# Patient Record
Sex: FEMALE | Race: WHITE | NOT HISPANIC OR LATINO | Employment: STUDENT | URBAN - METROPOLITAN AREA
[De-identification: names, ages, dates, MRNs, and addresses within clinical notes are randomized per-mention and may not be internally consistent; named-entity substitution may affect disease eponyms.]

---
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
## 2020-01-02 ENCOUNTER — PROCEDURE VISIT (OUTPATIENT)
Dept: OBSTETRICS AND GYNECOLOGY | Facility: CLINIC | Age: 20
End: 2020-01-02
Payer: COMMERCIAL

## 2020-01-02 VITALS
DIASTOLIC BLOOD PRESSURE: 74 MMHG | WEIGHT: 139.13 LBS | HEIGHT: 68 IN | SYSTOLIC BLOOD PRESSURE: 106 MMHG | BODY MASS INDEX: 21.09 KG/M2

## 2020-01-02 DIAGNOSIS — Z30.016 ENCOUNTER FOR INITIAL PRESCRIPTION OF TRANSDERMAL PATCH HORMONAL CONTRACEPTIVE DEVICE: Primary | ICD-10-CM

## 2020-01-02 LAB
B-HCG UR QL: NEGATIVE
CTP QC/QA: YES

## 2020-01-02 PROCEDURE — 81025 POCT URINE PREGNANCY: ICD-10-PCS | Mod: S$GLB,,, | Performed by: OBSTETRICS & GYNECOLOGY

## 2020-01-02 PROCEDURE — 11981 INSERTION OF NEXPLANON: ICD-10-PCS | Mod: S$GLB,,, | Performed by: OBSTETRICS & GYNECOLOGY

## 2020-01-02 PROCEDURE — 81025 URINE PREGNANCY TEST: CPT | Mod: S$GLB,,, | Performed by: OBSTETRICS & GYNECOLOGY

## 2020-01-02 PROCEDURE — 11981 INSERTION DRUG DLVR IMPLANT: CPT | Mod: S$GLB,,, | Performed by: OBSTETRICS & GYNECOLOGY

## 2020-01-02 NOTE — PROCEDURES
Insertion of Nexplanon  Date/Time: 1/2/2020 8:15 AM  Performed by: Tiffanie Mc MD  Authorized by: Tiffanie Mc MD     Consent obtained:  Written  Consent given by:  Patient  Patient questions answered: yes    Patient agrees, verbalizes understanding, and wants to proceed: yes    Educational handouts given: yes    Instructions and paperwork completed: yes    Pre-procedure timeout performed: yes    Prepped with: alcohol 70% and povidone-iodine    Local anesthetic:  Lidocaine without epinephrine  The site was cleaned and prepped in a sterile fashion: yes    Small stab incision was made in arm: yes    Left/right:  Left   68 mg etonogestrel 68 mg  Preloaded Implanon trocar was placed subdermally: yes    Visualization of implant was obtained: yes    Nexplanon was inserted and trocar removed: yes    Visualization of notch in stilette and palpitation of device: yes    Palpitation confirms placement by provider and patient: yes    Site was closed with steri-strips and pressure bandage applied: yes

## 2020-06-19 ENCOUNTER — OFFICE VISIT (OUTPATIENT)
Dept: PULMONOLOGY | Facility: CLINIC | Age: 20
End: 2020-06-19
Payer: COMMERCIAL

## 2020-06-19 VITALS
OXYGEN SATURATION: 100 % | DIASTOLIC BLOOD PRESSURE: 75 MMHG | WEIGHT: 136.81 LBS | HEART RATE: 81 BPM | SYSTOLIC BLOOD PRESSURE: 118 MMHG | BODY MASS INDEX: 20.74 KG/M2 | HEIGHT: 68 IN

## 2020-06-19 DIAGNOSIS — G47.10 HYPERSOMNIA: Primary | ICD-10-CM

## 2020-06-19 PROCEDURE — 99203 PR OFFICE/OUTPT VISIT, NEW, LEVL III, 30-44 MIN: ICD-10-PCS | Mod: S$GLB,,, | Performed by: NURSE PRACTITIONER

## 2020-06-19 PROCEDURE — 99999 PR PBB SHADOW E&M-EST. PATIENT-LVL IV: CPT | Mod: PBBFAC,,, | Performed by: NURSE PRACTITIONER

## 2020-06-19 PROCEDURE — 99999 PR PBB SHADOW E&M-EST. PATIENT-LVL IV: ICD-10-PCS | Mod: PBBFAC,,, | Performed by: NURSE PRACTITIONER

## 2020-06-19 PROCEDURE — 3008F PR BODY MASS INDEX (BMI) DOCUMENTED: ICD-10-PCS | Mod: CPTII,S$GLB,, | Performed by: NURSE PRACTITIONER

## 2020-06-19 PROCEDURE — 3008F BODY MASS INDEX DOCD: CPT | Mod: CPTII,S$GLB,, | Performed by: NURSE PRACTITIONER

## 2020-06-19 PROCEDURE — 99203 OFFICE O/P NEW LOW 30 MIN: CPT | Mod: S$GLB,,, | Performed by: NURSE PRACTITIONER

## 2020-06-19 RX ORDER — MODAFINIL 200 MG/1
TABLET ORAL
COMMUNITY
Start: 2020-06-02 | End: 2020-08-24 | Stop reason: SDUPTHER

## 2020-06-19 RX ORDER — GABAPENTIN 300 MG/1
CAPSULE ORAL
COMMUNITY
Start: 2020-04-13 | End: 2020-12-15

## 2020-06-19 NOTE — PROGRESS NOTES
Linda Bradford  was seen as a new patient self referred for the evaluation of  Sleeping problems    CHIEF COMPLAINT:    Chief Complaint   Patient presents with    Sleeping Problem       HISTORY OF PRESENT ILLNESS: Linda Bradford is a 20 y.o. female with hx SVT s/p radiofreq ablation 2013 @[facility], deviated nasal septum s/p septoplasty  is here for sleep evaluation. Patient with symptoms of occasional snoring, vivid nightmares, + sleep paralysis 2 x a week, excessive daytime sleepiness, waking up with myalgia, possible muscle weakness (clumsy, drops things per patient) and fatigue. Reports vivid dreams and sometimes have difficulty differentiating this from reality. Presents with mom. Sleepiness noted when patient was a teenager attributed to developmental stage since mom also reported sleeping a lot as a teenager. Symptoms started affecting her activity daily living following infection with mononucleosis during Freshman year college. Pt goes to college in [place]. She had a deviated nasal septum repair around this time and this improve headaches and snoring.       Patient had HST and an in lab sleep study May 2019 NA but pt reports negative except for slight snoring.      She was trial on trazodone, prozac, xanax with minimal improvement. She currently on gabapentin which helps with nightmares and provigil during the day which helps keep her awake but she continues to feel sleepy and tired despite these medications.     [place] Sleepiness Scale score 19      SLEEP ROUTINE:  Time to bed:  2100  Sleep onset latency: 30 min      Disruptions or awakenings:   4 x without the gabapentin   Wakeup time:      1000  Perceived sleep quality:  restless   Daytime naps:     yes -1 nap(s) for about 1.5 hours usually around 1400 but anytime and  May doze off      PAST MEDICAL HISTORY:    Active Ambulatory Problems     Diagnosis Date Noted    SVT (supraventricular tachycardia) 01/22/2013    Palpitations 01/22/2013     Hypersomnia 2020     Resolved Ambulatory Problems     Diagnosis Date Noted    No Resolved Ambulatory Problems     Past Medical History:   Diagnosis Date    Supraventricular tachycardia                 PAST SURGICAL HISTORY:    Past Surgical History:   Procedure Laterality Date    RADIOFREQUENCY ABLATION           FAMILY HISTORY:                Family History   Problem Relation Age of Onset    Stroke Paternal Grandmother     Stroke Maternal Grandmother     Breast cancer Neg Hx     Colon cancer Neg Hx     Diabetes Neg Hx     Eclampsia Neg Hx     Ovarian cancer Neg Hx      labor Neg Hx        SOCIAL HISTORY:          Tobacco:   Social History     Tobacco Use   Smoking Status Never Smoker   Smokeless Tobacco Never Used       alcohol use:    Social History     Substance and Sexual Activity   Alcohol Use Yes                 Occupation:student    ALLERGIES:  Review of patient's allergies indicates:  No Known Allergies    CURRENT MEDICATIONS:    Current Outpatient Medications   Medication Sig Dispense Refill    ibuprofen (ADVIL,MOTRIN) 400 MG tablet Take 400 mg by mouth.      gabapentin (NEURONTIN) 300 MG capsule       modafiniL (PROVIGIL) 200 MG Tab        Current Facility-Administered Medications   Medication Dose Route Frequency Provider Last Rate Last Dose    etonogestrel subdermal device 68 mg  68 mg   Tiffanie Mc MD   68 mg at 20 0815                  REVIEW OF SYSTEMS:   Review of Systems   Constitutional: Positive for activity change and fatigue. Negative for fever and chills.   HENT: Positive for congestion (occ).    Respiratory: Positive for snoring and somnolence. Negative for shortness of breath.         Sih alot   Cardiovascular: Negative for chest pain, palpitations and leg swelling.   Musculoskeletal: Positive for myalgias.   Gastrointestinal: Negative for acid reflux.   Neurological: Positive for headaches.   Psychiatric/Behavioral: Positive for sleep disturbance.  "       PHYSICAL EXAM:  Vitals:    06/19/20 1430   BP: 118/75   Pulse: 81   SpO2: 100%   Weight: 62.1 kg (136 lb 12.7 oz)   Height: 5' 8" (1.727 m)   PainSc: 0-No pain     Body mass index is 20.8 kg/m².   Physical Exam   Constitutional: She is oriented to person, place, and time. She appears well-developed. No distress. She is not obese.   HENT:   Head: Normocephalic.   Nose: Nose normal.   Mouth/Throat: Oropharynx is clear and moist. Mallampati Score: II.   Neck: Neck supple.   Cardiovascular: Normal rate, regular rhythm and normal heart sounds.   Pulmonary/Chest: Normal expansion, effort normal and breath sounds normal.   Musculoskeletal:         General: No edema.   Neurological: She is alert and oriented to person, place, and time. Gait normal.   Skin: Skin is warm. She is not diaphoretic.   Psychiatric: She has a normal mood and affect. Her behavior is normal. Judgment and thought content normal.                                               DATA:  TSH:  No results found for: TSH  CBC:  No results found for: WBC, HGB, HCT, MCV, PLT  BMP:  No results found for: NA, K, CL, CO2, BUN, CREATININE, CALCIUM, ANIONGAP, ESTGFRAFRICA, EGFRNONAA  HgbA1C:  No results found for: LABA1C, HGBA1C       Sleep study: SKYLAR to obtain from [facility] in [place]          ASSESSMENT    ICD-10-CM ICD-9-CM    1. Hypersomnia  G47.10 780.54 Polysomnography with mslt       PLAN:    Problem List Items Addressed This Visit        Unprioritized    Hypersomnia - Primary    Overview     Patient with symptoms of excessive daytime sleepiness impairing daytime function with sleep paralysis and symptoms of possible cataplexy despite a negative workup for sleep apnea and stimulants. Recommend a PSG with MSLT for further evaluation for possible narcolepsy.          Current Assessment & Plan     Patient aware need to discontinue her medications atleast 2 weeks prior to PSG with MSLT. Pt goes to school in [place] and request her study be completed between " July 22 and August 1st or during Reyes break due to school schedule.          Relevant Orders    Polysomnography with mslt          Patient will Follow up pending sleep study.

## 2020-06-19 NOTE — LETTER
June 22, 2020      [employer]  [address] [address]  [facility] [address]           [facility]  [address]   [place] [address]  Phone: [phone]  Fax: [phone]          Patient: Linda Bradford   MR Number: [id number]   YOB: 2000   Date of Visit: 6/19/2020       Dear [employer]:    Thank you for referring Linda Bradford to me for evaluation. Attached you will find relevant portions of my assessment and plan of care.    If you have questions, please do not hesitate to call me. I look forward to following Linda Bradford along with you.    Sincerely,    Theresa Martell, NP    Enclosure  CC:  No Recipients    If you would like to receive this communication electronically, please contact [email or web address] or [phone] to request more information on [employer] access.    For providers and/or their staff who would like to refer a patient to [facility], please contact us through our one-stop-shop provider referral line, Miriam Lam, at [phone].    If you feel you have received this communication in error or would no longer like to receive these types of communications, please e-mail [email or web address]

## 2020-06-19 NOTE — PATIENT INSTRUCTIONS
Information regarding testing ordered by your provider today:    IN LAB, INITIAL DIAGNOSTIC STUDY:  Your provider has ordered a sleep study. This order has been sent to our billing/pre-service department to be approved by your insurance company. Once the study has been approved (this can take up to 14 business days depending on your insurance), we will call you to schedule an appointment. Once the appointment is scheduled, our Financial Clearance Call Center will be able to provide you with an anticipated out of pocket cost, such as a co-payment or unmet deductible amount. The Financial Clearance Call Center can be reached at [phone]. You may also call your insurance company directly and give them the procedure code CPT 22953 to receive an estimate of your out of pocket cost.

## 2020-06-22 PROBLEM — G47.10 HYPERSOMNIA: Status: ACTIVE | Noted: 2020-06-22

## 2020-06-22 NOTE — ASSESSMENT & PLAN NOTE
Patient aware need to discontinue her medications atleast 2 weeks prior to PSG with MSLT. Pt goes to school in [place] and request her study be completed between July 22 and August 1st or during Reyes break due to school schedule.

## 2020-07-29 ENCOUNTER — HOSPITAL ENCOUNTER (OUTPATIENT)
Dept: SLEEP MEDICINE | Facility: OTHER | Age: 20
Discharge: HOME | End: 2020-07-29
Attending: NURSE PRACTITIONER
Payer: COMMERCIAL

## 2020-07-29 DIAGNOSIS — G47.33 OSA (OBSTRUCTIVE SLEEP APNEA): ICD-10-CM

## 2020-07-29 DIAGNOSIS — G47.10 HYPERSOMNIA: ICD-10-CM

## 2020-07-29 PROCEDURE — 95810 POLYSOM 6/> YRS 4/> PARAM: CPT | Mod: 26,,, | Performed by: INTERNAL MEDICINE

## 2020-07-29 PROCEDURE — 95810 POLYSOM 6/> YRS 4/> PARAM: CPT

## 2020-07-29 PROCEDURE — 95810 PR POLYSOMNOGRAPHY, 4 OR MORE: ICD-10-PCS | Mod: 26,,, | Performed by: INTERNAL MEDICINE

## 2020-07-29 NOTE — Clinical Note
Theresa mslt was canceled due to high ahi.  Patient is leaving town today and was hoping to hear from you today.

## 2020-07-30 ENCOUNTER — TELEPHONE (OUTPATIENT)
Dept: PULMONOLOGY | Facility: CLINIC | Age: 20
End: 2020-07-30

## 2020-07-30 DIAGNOSIS — G47.33 OSA (OBSTRUCTIVE SLEEP APNEA): Primary | ICD-10-CM

## 2020-07-30 DIAGNOSIS — G47.10 HYPERSOMNIA: ICD-10-CM

## 2020-07-30 RX ORDER — METHYLPHENIDATE HYDROCHLORIDE 5 MG/1
TABLET ORAL
Qty: 60 TABLET | Refills: 0 | Status: SHIPPED | OUTPATIENT
Start: 2020-07-30 | End: 2020-08-03 | Stop reason: SDUPTHER

## 2020-07-30 NOTE — TELEPHONE ENCOUNTER
Spoke to pt, inform of sleep study. Trial apap, pt going to [place] for school so will  during thanksgiving break and f/u after laurie. Ritalin as needed in addition to provigil for EDS.

## 2020-07-30 NOTE — PROCEDURES
"Dear Provider,     You have ordered sleep LAB services to perform the sleep study for Linda Bradford.  The sleep study that you ordered is complete.      Please find Sleep Study result in "Chart Review" under the "Media tab."      As the ordering provider, you are responsible for reviewing the results and implementing a treatment plan with your patient.    If you need a Sleep Medicine provider to explain the sleep study findings and arrange treatment for the patient, please refer patient for consultation to our Sleep Clinic via [facility] with Ambulatory Consult Sleep.    To do that please place an order for an  "Ambulatory Consult Sleep" - it will go to our clinic work queue for our Medical Assistant to contact the patient for an appointment.     For any questions, please contact our clinic staff at [phone] to talk to clinical staff.   "

## 2020-07-30 NOTE — TELEPHONE ENCOUNTER
----- Message from Parul Rosas MA sent at 7/30/2020  4:59 PM CDT -----  Spoke to patient she states that she is returning your phone call in regards to her sleep study please advise.    Thank you   ----- Message -----  From: Ivanna Galeas  Sent: 7/30/2020   4:54 PM CDT  To: [facility] Staff        Name of Who is Calling: JAIRO FREITAS [[id number]]      What is the request in detail: Pt would like to speak with the office.Please contact to further discuss and advise.          Can the clinic reply by [facility]: N      What Number to Call Back [facility] not [facility]: [phone]

## 2020-07-30 NOTE — PROGRESS NOTES
Linda Bradford to [facility] for an overnight sleep study on 07/29/2020. Pt. education,setup explanation given prior to study.    Pt. did not meet emergent criteria for cpap.  Few events observed . No snore. Arousals noted.    Short episode of somnilquy.  Arm leads added prior to study follwing report of frequent nightmares and restless sleep.  No excessive motor activity observed with REM.    EKG- Lead 2 appears wirh NSR.  Low saturation noted 95%.    AM report to MARIA ISABEL JIANG.  MSLT to follow this AM.

## 2020-08-03 ENCOUNTER — TELEPHONE (OUTPATIENT)
Dept: PULMONOLOGY | Facility: CLINIC | Age: 20
End: 2020-08-03

## 2020-08-03 DIAGNOSIS — G47.10 HYPERSOMNIA: ICD-10-CM

## 2020-08-03 DIAGNOSIS — G47.33 OSA (OBSTRUCTIVE SLEEP APNEA): ICD-10-CM

## 2020-08-03 RX ORDER — METHYLPHENIDATE HYDROCHLORIDE 5 MG/1
TABLET ORAL
Qty: 60 TABLET | Refills: 0 | Status: SHIPPED | OUTPATIENT
Start: 2020-08-03 | End: 2020-08-20 | Stop reason: SDUPTHER

## 2020-08-03 NOTE — TELEPHONE ENCOUNTER
----- Message from Parul Rosas MA sent at 8/3/2020  1:53 PM CDT -----  Regarding: FW: medication  Patient states that the [facility] pharmacy never received the EPrescribe , patient would like the medication to go to [address] Where she is currently located at the moment.    Please advise  Thank you   ----- Message -----  From: Kayla Goncalves  Sent: 8/3/2020  12:42 PM CDT  To: Jasbir Cardenas Staff  Subject: medication                                       Type: Patient Call Back    Who called:self    What is the request in detail:patient would like to speak to nurse regarding medication    Can the clinic reply by [facility]?no    Would the patient rather a call back or a response via [facility]? callback    Best call back number:  [phone]

## 2020-08-03 NOTE — TELEPHONE ENCOUNTER
Patient states that the [facility] pharmacy never received the EPrescribe , patient would like the medication to go to [address] Where she is currently located at the moment.

## 2020-08-05 DIAGNOSIS — G47.10 HYPERSOMNIA: ICD-10-CM

## 2020-08-05 DIAGNOSIS — G47.33 OSA (OBSTRUCTIVE SLEEP APNEA): ICD-10-CM

## 2020-08-05 NOTE — TELEPHONE ENCOUNTER
----- Message from KalebMauriziojohana Del Rio sent at 8/5/2020 10:28 AM CDT -----  Regarding: Refill  Contact: PATIENT  Type: Patient Call Back    Who called:Patient     What is the request in detail: She needs refill on: methylphenidate HCl (RITALIN) 5 MG tablet; she never received refill    Can the clinic reply by [facility]? NO    Would the patient rather a call back or a response via [facility]? CALL    Best call back number: [phone] (home)     Additional Information:   [id number] [place] [facility] [place] [place] - [address] [place] [address]  Phone: [phone] Fax: [phone]

## 2020-08-11 RX ORDER — METHYLPHENIDATE HYDROCHLORIDE 5 MG/1
TABLET ORAL
Qty: 60 TABLET | Refills: 0 | OUTPATIENT
Start: 2020-08-11

## 2020-08-13 ENCOUNTER — TELEPHONE (OUTPATIENT)
Dept: PULMONOLOGY | Facility: CLINIC | Age: 20
End: 2020-08-13

## 2020-08-13 NOTE — TELEPHONE ENCOUNTER
Left voicemail        ----- Message from Magaly Kelly sent at 8/12/2020 12:23 PM CDT -----  Type:  Patient Returning Call    Who Called: pt    Who Left Message for Patient:  Parul    Does the patient know what this is regarding?:    Would the patient rather a call back or a response via [facility]? Call back     Best Call Back Number: [phone]    Additional Information:

## 2020-08-20 ENCOUNTER — TELEPHONE (OUTPATIENT)
Dept: PULMONOLOGY | Facility: CLINIC | Age: 20
End: 2020-08-20

## 2020-08-20 ENCOUNTER — TELEPHONE (OUTPATIENT)
Dept: SLEEP MEDICINE | Facility: CLINIC | Age: 20
End: 2020-08-20

## 2020-08-20 DIAGNOSIS — G47.33 OSA (OBSTRUCTIVE SLEEP APNEA): ICD-10-CM

## 2020-08-20 DIAGNOSIS — G47.10 HYPERSOMNIA: ICD-10-CM

## 2020-08-20 RX ORDER — METHYLPHENIDATE HYDROCHLORIDE 5 MG/1
TABLET ORAL
Qty: 60 TABLET | Refills: 0 | Status: SHIPPED | OUTPATIENT
Start: 2020-08-20 | End: 2020-12-15

## 2020-08-21 ENCOUNTER — TELEPHONE (OUTPATIENT)
Dept: SLEEP MEDICINE | Facility: CLINIC | Age: 20
End: 2020-08-21

## 2020-08-21 NOTE — TELEPHONE ENCOUNTER
----- Message from Charisse Del Rio sent at 8/21/2020 12:36 PM CDT -----  Type:  Patient Returning Call    Who Called:  Self     Who Left Message for Patient:  Dr. Dillon    Does the patient know what this is regarding?: yes     Would the patient rather a call back or a response via [facility]?  Call     Best Call Back Number:[phone]

## 2020-08-21 NOTE — TELEPHONE ENCOUNTER
----- Message from Oly Martin sent at 8/21/2020  1:36 PM CDT -----  Regarding: self [phone]  .Type:  Patient Returning Call    Who Called:  self     Who Left Message for Patient: Melissa Dillon    Would the patient rather a call back or a response via [facility]?  Call back     Best Call Back Number: [phone]

## 2020-08-24 ENCOUNTER — TELEPHONE (OUTPATIENT)
Dept: SLEEP MEDICINE | Facility: CLINIC | Age: 20
End: 2020-08-24

## 2020-08-24 ENCOUNTER — OFFICE VISIT (OUTPATIENT)
Dept: SLEEP MEDICINE | Facility: CLINIC | Age: 20
End: 2020-08-24
Payer: COMMERCIAL

## 2020-08-24 DIAGNOSIS — G47.10 HYPERSOMNIA: ICD-10-CM

## 2020-08-24 DIAGNOSIS — G47.33 OSA (OBSTRUCTIVE SLEEP APNEA): Primary | ICD-10-CM

## 2020-08-24 PROCEDURE — 99203 PR OFFICE/OUTPT VISIT, NEW, LEVL III, 30-44 MIN: ICD-10-PCS | Mod: 95,,, | Performed by: INTERNAL MEDICINE

## 2020-08-24 PROCEDURE — 99203 OFFICE O/P NEW LOW 30 MIN: CPT | Mod: 95,,, | Performed by: INTERNAL MEDICINE

## 2020-08-24 RX ORDER — MODAFINIL 200 MG/1
200 TABLET ORAL 2 TIMES DAILY
Qty: 60 TABLET | Refills: 3 | Status: SHIPPED | OUTPATIENT
Start: 2020-08-24 | End: 2020-10-16 | Stop reason: SDUPTHER

## 2020-08-24 NOTE — TELEPHONE ENCOUNTER
----- Message from Melissa Dillon MD sent at 8/24/2020 11:47 AM CDT -----  Please get copy of sleep study 2019 from [facility] in [place].   Thanks

## 2020-08-24 NOTE — PROGRESS NOTES
Subjective:       Patient ID: Linda Bradford is a 20 y.o. female.    Chief Complaint: Sleeping Problem    HPI     I had the pleasure of seeing Linda Bradford today, who is a 20 y.o. female that presents with Obstructive Sleep Apnea  .    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    Linda Bradford presents with TIM that has been going on for 13 months   PSG showed AHI 16.   Patient is not currently treated.   She was placed on Ritalin to combat sleepiness until treated.   Patient had sleep study in [place] in 2019 that was reportedly normal.   Patient also on Provigil, taking 200 mg daily.   Feels sleepy around 1500.      Bedtime when working ranges from NA to NA.   When not working, bedtime ranges from 2200 to 2300.   Sleep latency ranges from 10 to 15 minutes.     Average number of awakenings is 0-1 and return to sleep is quick.   Wake up time when working is NA to NA.   When not working, wake up time is 1030 to 1100.   Patient does not feel rested upon awakening.         Linda Bradford does experience daytime sleepiness.   Naps are taken about 4 times weekly, usually lasting 45 to 60 minutes.  Linda currently does operate an automobile.  Linda Bradford does not experience drowsiness when driving.   Patient does doze off when sedentary.   Linda Bradford does not have auxiliary symptoms of narcolepsy including sleep onset paralysis, hypnagogic hallucinations, sleep attacks and cataplexy    EPWORTH SLEEPINESS SCALE 6/22/2020   Sitting and reading 3   Watching TV 3   Sitting, inactive in a public place (e.g. a theatre or a meeting) 2   As a passenger in a car for an hour without a break 3   Lying down to rest in the afternoon when circumstances permit 3   Sitting and talking to someone 1   Sitting quietly after a lunch without alcohol 2   In a car, while stopped for a few minutes in traffic 2   Total score 19       Linda Bradford has a history of snoring.    Snoring is described as mild and constant.   Apneic episodes have been noticed during sleep.   A witness to sleep is not present.   The patient awakens with mouth dryness.      Linda Bradford does not have symptoms of Restless Legs Syndrome. Nocturnal leg movements have not been noticed.   The patient does not experience sleep related leg cramps.   There is not a history of parasomnia except sleep walking during childhood.      Current Outpatient Medications:     gabapentin (NEURONTIN) 300 MG capsule, , Disp: , Rfl:     ibuprofen (ADVIL,MOTRIN) 400 MG tablet, Take 400 mg by mouth., Disp: , Rfl:     methylphenidate HCl (RITALIN) 5 MG tablet, Take 1-2 tablet by mouth in afternoon as needed for hypersomnia, Disp: 60 tablet, Rfl: 0    modafiniL (PROVIGIL) 200 MG Tab, , Disp: , Rfl:     Current Facility-Administered Medications:     etonogestrel subdermal device 68 mg, 68 mg, , , Tiffanie Mc MD, 68 mg at 20 0815     Review of patient's allergies indicates:  No Known Allergies      Past Medical History:   Diagnosis Date    Supraventricular tachycardia        Past Surgical History:   Procedure Laterality Date    RADIOFREQUENCY ABLATION         Family History   Problem Relation Age of Onset    Stroke Paternal Grandmother     Stroke Maternal Grandmother     Breast cancer Neg Hx     Colon cancer Neg Hx     Diabetes Neg Hx     Eclampsia Neg Hx     Ovarian cancer Neg Hx      labor Neg Hx        Social History     Socioeconomic History    Marital status: Single     Spouse name: Not on file    Number of children: Not on file    Years of education: Not on file    Highest education level: Not on file   Occupational History    Not on file   Social Needs    Financial resource strain: Not on file    Food insecurity     Worry: Not on file     Inability: Not on file    Transportation needs     Medical: Not on file     Non-medical: Not on file   Tobacco Use    Smoking status: Never Smoker     Smokeless tobacco: Never Used   Substance and Sexual Activity    Alcohol use: Yes    Drug use: No    Sexual activity: Yes     Partners: Male     Birth control/protection: Condom   Lifestyle    Physical activity     Days per week: Not on file     Minutes per session: Not on file    Stress: Not on file   Relationships    Social connections     Talks on phone: Not on file     Gets together: Not on file     Attends Tenriism service: Not on file     Active member of club or organization: Not on file     Attends meetings of clubs or organizations: Not on file     Relationship status: Not on file   Other Topics Concern    Not on file   Social History Narrative    Not on file           Old medical records.    There were no vitals filed for this visit.           The patient was given open opportunity to ask questions and/or express concerns about treatment plan.   All questions/concerns were discussed.   Driving precautions were provided.     Two patient identifiers used prior to evaluation.    Thank you for referring Linda Bradford for evaluation.           Review of Systems      Objective:      Physical Exam    Assessment:       1. TIM (obstructive sleep apnea)    2. Hypersomnia        Plan:        The pathophysiology of TIM was discussed.   The effects of TIM on patient's co-morbid conditions and the increased morbidity and/or mortality associated with this condition were reviewed.   The patient was given open opportunity to ask questions and/or express concerns about treatment plan.   All questions/concerns were discussed.   Driving precautions were provided.   Prior records requested.   Do not start Ritalin.   Provigil 200 mg in morning and 100-200 mg around 1400.   Discussed oral appliance.       Thank you for referring Lidna Bradford for evaluation.       The patient location is: [facility]  The chief complaint leading to consultation is: hypersomnia    Visit type: audiovisual    Face to Face time with  patient: 21  32 minutes of total time spent on the encounter, which includes face to face time and non-face to face time preparing to see the patient (eg, review of tests), Obtaining and/or reviewing separately obtained history, Documenting clinical information in the electronic or other health record, Independently interpreting results (not separately reported) and communicating results to the patient/family/caregiver, or Care coordination (not separately reported).         Each patient to whom he or she provides medical services by telemedicine is:  (1) informed of the relationship between the physician and patient and the respective role of any other health care provider with respect to management of the patient; and (2) notified that he or she may decline to receive medical services by telemedicine and may withdraw from such care at any time.    Notes:

## 2020-08-24 NOTE — TELEPHONE ENCOUNTER
----- Message from Melissa Dillon MD sent at 8/24/2020 11:47 AM CDT -----  Please get copy of sleep study 2019 from [facility] in [place].   Thanks

## 2020-08-26 ENCOUNTER — PATIENT MESSAGE (OUTPATIENT)
Dept: SLEEP MEDICINE | Facility: CLINIC | Age: 20
End: 2020-08-26

## 2020-08-26 DIAGNOSIS — G47.33 OSA (OBSTRUCTIVE SLEEP APNEA): Primary | ICD-10-CM

## 2020-08-26 DIAGNOSIS — G47.10 HYPERSOMNIA: ICD-10-CM

## 2020-09-22 ENCOUNTER — PATIENT MESSAGE (OUTPATIENT)
Dept: SLEEP MEDICINE | Facility: CLINIC | Age: 20
End: 2020-09-22

## 2020-10-04 ENCOUNTER — PATIENT MESSAGE (OUTPATIENT)
Dept: SLEEP MEDICINE | Facility: CLINIC | Age: 20
End: 2020-10-04

## 2020-11-09 ENCOUNTER — OFFICE VISIT (OUTPATIENT)
Dept: SLEEP MEDICINE | Facility: CLINIC | Age: 20
End: 2020-11-09
Payer: COMMERCIAL

## 2020-11-09 DIAGNOSIS — G47.33 OSA (OBSTRUCTIVE SLEEP APNEA): Primary | ICD-10-CM

## 2020-11-09 PROCEDURE — 99213 PR OFFICE/OUTPT VISIT, EST, LEVL III, 20-29 MIN: ICD-10-PCS | Mod: 95,,, | Performed by: INTERNAL MEDICINE

## 2020-11-09 PROCEDURE — 99213 OFFICE O/P EST LOW 20 MIN: CPT | Mod: 95,,, | Performed by: INTERNAL MEDICINE

## 2020-11-09 NOTE — PROGRESS NOTES
Subjective:       Patient ID: Linda Bradford is a 20 y.o. female.    Chief Complaint: Sleep Apnea    HPI     Got oral appliance for TIM.   In process of progressively advancing jaw placement.   Advised to move to position where front teeth aligned before further adjustment.   Still using Provigil most days at 400 mg.   Will try to discontinue when home for school break and using oral appliance and final adjustment.     Review of Systems      Objective:      Physical Exam    Assessment:       1. TIM (obstructive sleep apnea)        Plan:       HST when oral appliance adjusted.    The patient location is: school  The chief complaint leading to consultation is: TIM    Visit type: audiovisual    Face to Face time with patient: 12  16 minutes of total time spent on the encounter, which includes face to face time and non-face to face time preparing to see the patient (eg, review of tests), Obtaining and/or reviewing separately obtained history, Documenting clinical information in the electronic or other health record, Independently interpreting results (not separately reported) and communicating results to the patient/family/caregiver, or Care coordination (not separately reported).         Each patient to whom he or she provides medical services by telemedicine is:  (1) informed of the relationship between the physician and patient and the respective role of any other health care provider with respect to management of the patient; and (2) notified that he or she may decline to receive medical services by telemedicine and may withdraw from such care at any time.    Notes:

## 2020-11-16 ENCOUNTER — TELEPHONE (OUTPATIENT)
Dept: SLEEP MEDICINE | Facility: OTHER | Age: 20
End: 2020-11-16

## 2020-11-23 ENCOUNTER — TELEPHONE (OUTPATIENT)
Dept: SLEEP MEDICINE | Facility: CLINIC | Age: 20
End: 2020-11-23

## 2020-11-23 NOTE — TELEPHONE ENCOUNTER
----- Message from Seamus Polk sent at 11/23/2020  3:42 PM CST -----  Name of Who is Calling: JAIRO FREITAS  What is the request in detail: The patient is calling to speak to the nurse in regards to finding out if she can return the equipment Monday 11/30/2020 in stead of Friday 11/27/2020 due to her being out of town for the holidays. Please advise Can the clinic reply by [facility]: Yes What Number to Call Back [facility] not [facility]: [phone]

## 2020-11-23 NOTE — TELEPHONE ENCOUNTER
----- Message from Seamus Polk sent at 11/23/2020  3:42 PM CST -----  Name of Who is Calling: JAIRO FREITAS  What is the request in detail: The patient is calling to speak to the nurse in regards to finding out if she can return the equipment Monday 11/30/2020 in stead of Friday 11/27/2020 due to her being out of town for the holidays. Please advise Can the clinic reply by [facility]: Yes What Number to Call Back if not [facility]: [phone]

## 2020-11-24 ENCOUNTER — TELEPHONE (OUTPATIENT)
Dept: SLEEP MEDICINE | Facility: OTHER | Age: 20
End: 2020-11-24

## 2020-12-03 ENCOUNTER — TELEPHONE (OUTPATIENT)
Dept: SLEEP MEDICINE | Facility: OTHER | Age: 20
End: 2020-12-03

## 2020-12-04 ENCOUNTER — HOSPITAL ENCOUNTER (OUTPATIENT)
Dept: SLEEP MEDICINE | Facility: OTHER | Age: 20
Discharge: HOME | End: 2020-12-04
Attending: INTERNAL MEDICINE
Payer: COMMERCIAL

## 2020-12-04 DIAGNOSIS — G47.33 OSA (OBSTRUCTIVE SLEEP APNEA): ICD-10-CM

## 2020-12-04 PROCEDURE — 95800 SLP STDY UNATTENDED: CPT

## 2020-12-04 PROCEDURE — 95800 PR SLEEP STUDY, UNATTENDED, RECORD HEART RATE/O2 SAT/RESP ANAL/SLEEP TIME: ICD-10-PCS | Mod: 26,,, | Performed by: INTERNAL MEDICINE

## 2020-12-04 PROCEDURE — 95800 SLP STDY UNATTENDED: CPT | Mod: 26,,, | Performed by: INTERNAL MEDICINE

## 2020-12-08 ENCOUNTER — PATIENT MESSAGE (OUTPATIENT)
Dept: SLEEP MEDICINE | Facility: CLINIC | Age: 20
End: 2020-12-08

## 2020-12-08 NOTE — PROCEDURES
"The sleep study that you ordered is complete.  You have ordered sleep LAB services to perform the sleep study for Linda Bradford     Please find Sleep Study result in  the "Media tab" of Chart Review menu.     Patient is already established with a Sleep Medicine provider        For any questions, please contact our clinic staff at [phone] to talk to clinical staff.     "

## 2020-12-09 ENCOUNTER — TELEPHONE (OUTPATIENT)
Dept: INTERNAL MEDICINE | Facility: CLINIC | Age: 20
End: 2020-12-09

## 2020-12-09 ENCOUNTER — PATIENT MESSAGE (OUTPATIENT)
Dept: SLEEP MEDICINE | Facility: CLINIC | Age: 20
End: 2020-12-09

## 2020-12-09 NOTE — TELEPHONE ENCOUNTER
----- Message from Fred Dominguez sent at 12/9/2020  2:19 PM CST -----  Contact: Pt mother Cee  The pt mother, Cee, called and said that she left a message there a week ago and no one has called her back    Cee and her  see Dr. Gill and she would like to know, either way, if he would see their daughter.     Cee can be reached at [phone]

## 2020-12-10 NOTE — TELEPHONE ENCOUNTER
Informed pt mother of  message below. Pt scheduled on 12/15/2020 @ 8 am [facility] IM.  Pt mother verbalized understanding

## 2020-12-11 ENCOUNTER — PATIENT MESSAGE (OUTPATIENT)
Dept: SLEEP MEDICINE | Facility: CLINIC | Age: 20
End: 2020-12-11

## 2020-12-15 ENCOUNTER — OFFICE VISIT (OUTPATIENT)
Dept: SLEEP MEDICINE | Facility: CLINIC | Age: 20
End: 2020-12-15
Payer: COMMERCIAL

## 2020-12-15 ENCOUNTER — OFFICE VISIT (OUTPATIENT)
Dept: INTERNAL MEDICINE | Facility: CLINIC | Age: 20
End: 2020-12-15
Payer: COMMERCIAL

## 2020-12-15 ENCOUNTER — TELEPHONE (OUTPATIENT)
Dept: SLEEP MEDICINE | Facility: CLINIC | Age: 20
End: 2020-12-15

## 2020-12-15 ENCOUNTER — OFFICE VISIT (OUTPATIENT)
Dept: OTOLARYNGOLOGY | Facility: CLINIC | Age: 20
End: 2020-12-15
Payer: COMMERCIAL

## 2020-12-15 VITALS
HEIGHT: 68 IN | SYSTOLIC BLOOD PRESSURE: 105 MMHG | DIASTOLIC BLOOD PRESSURE: 66 MMHG | HEART RATE: 106 BPM | BODY MASS INDEX: 20.58 KG/M2 | TEMPERATURE: 98 F | WEIGHT: 135.81 LBS

## 2020-12-15 VITALS
DIASTOLIC BLOOD PRESSURE: 82 MMHG | HEIGHT: 68 IN | HEART RATE: 88 BPM | OXYGEN SATURATION: 99 % | BODY MASS INDEX: 20.32 KG/M2 | WEIGHT: 134.06 LBS | SYSTOLIC BLOOD PRESSURE: 126 MMHG

## 2020-12-15 DIAGNOSIS — G47.9 SLEEP DISTURBANCE: Primary | ICD-10-CM

## 2020-12-15 DIAGNOSIS — R53.83 FATIGUE, UNSPECIFIED TYPE: ICD-10-CM

## 2020-12-15 DIAGNOSIS — Z76.89 ENCOUNTER TO ESTABLISH CARE WITH NEW DOCTOR: Primary | ICD-10-CM

## 2020-12-15 DIAGNOSIS — G47.10 HYPERSOMNIA: ICD-10-CM

## 2020-12-15 DIAGNOSIS — G47.33 OSA (OBSTRUCTIVE SLEEP APNEA): ICD-10-CM

## 2020-12-15 DIAGNOSIS — G47.33 OSA (OBSTRUCTIVE SLEEP APNEA): Primary | ICD-10-CM

## 2020-12-15 DIAGNOSIS — R63.0 DECREASED APPETITE: ICD-10-CM

## 2020-12-15 PROBLEM — J34.2 DEVIATED NASAL SEPTUM: Status: ACTIVE | Noted: 2018-12-18

## 2020-12-15 PROCEDURE — 99213 PR OFFICE/OUTPT VISIT, EST, LEVL III, 20-29 MIN: ICD-10-PCS | Mod: 95,,, | Performed by: INTERNAL MEDICINE

## 2020-12-15 PROCEDURE — 99204 PR OFFICE/OUTPT VISIT, NEW, LEVL IV, 45-59 MIN: ICD-10-PCS | Mod: S$GLB,,, | Performed by: FAMILY MEDICINE

## 2020-12-15 PROCEDURE — 3008F BODY MASS INDEX DOCD: CPT | Mod: CPTII,S$GLB,, | Performed by: OTOLARYNGOLOGY

## 2020-12-15 PROCEDURE — 99204 OFFICE O/P NEW MOD 45 MIN: CPT | Mod: S$GLB,,, | Performed by: OTOLARYNGOLOGY

## 2020-12-15 PROCEDURE — 1126F PR PAIN SEVERITY QUANTIFIED, NO PAIN PRESENT: ICD-10-PCS | Mod: S$GLB,,, | Performed by: FAMILY MEDICINE

## 2020-12-15 PROCEDURE — 99999 PR PBB SHADOW E&M-EST. PATIENT-LVL III: ICD-10-PCS | Mod: PBBFAC,,, | Performed by: FAMILY MEDICINE

## 2020-12-15 PROCEDURE — 3008F PR BODY MASS INDEX (BMI) DOCUMENTED: ICD-10-PCS | Mod: CPTII,S$GLB,, | Performed by: OTOLARYNGOLOGY

## 2020-12-15 PROCEDURE — 3008F PR BODY MASS INDEX (BMI) DOCUMENTED: ICD-10-PCS | Mod: CPTII,S$GLB,, | Performed by: FAMILY MEDICINE

## 2020-12-15 PROCEDURE — 99999 PR PBB SHADOW E&M-EST. PATIENT-LVL III: CPT | Mod: PBBFAC,,, | Performed by: FAMILY MEDICINE

## 2020-12-15 PROCEDURE — 1126F AMNT PAIN NOTED NONE PRSNT: CPT | Mod: S$GLB,,, | Performed by: OTOLARYNGOLOGY

## 2020-12-15 PROCEDURE — 1126F PR PAIN SEVERITY QUANTIFIED, NO PAIN PRESENT: ICD-10-PCS | Mod: S$GLB,,, | Performed by: OTOLARYNGOLOGY

## 2020-12-15 PROCEDURE — 99213 OFFICE O/P EST LOW 20 MIN: CPT | Mod: 95,,, | Performed by: INTERNAL MEDICINE

## 2020-12-15 PROCEDURE — 3008F BODY MASS INDEX DOCD: CPT | Mod: CPTII,S$GLB,, | Performed by: FAMILY MEDICINE

## 2020-12-15 PROCEDURE — 99204 OFFICE O/P NEW MOD 45 MIN: CPT | Mod: S$GLB,,, | Performed by: FAMILY MEDICINE

## 2020-12-15 PROCEDURE — 1126F AMNT PAIN NOTED NONE PRSNT: CPT | Mod: S$GLB,,, | Performed by: FAMILY MEDICINE

## 2020-12-15 PROCEDURE — 99204 PR OFFICE/OUTPT VISIT, NEW, LEVL IV, 45-59 MIN: ICD-10-PCS | Mod: S$GLB,,, | Performed by: OTOLARYNGOLOGY

## 2020-12-15 NOTE — PROGRESS NOTES
"Subjective:       Patient ID: Linda Bradford is a 20 y.o. female.    Chief Complaint: Sleeping Problem    HPI     The results of Linda Bradford"s sleep study were reviewed in detail.   Treatment alternatives discussed.   Risks of untreated disease reviewed.   Goals of therapy were outlined.   Linda Bradford agrees to treatment plan.   Linda Bradford was giving opportunity to ask questions and voice concerns.       Two point patient identifier confirmed.           Review of Systems      Objective:      Physical Exam    Assessment:       1. TIM (obstructive sleep apnea)        Plan:       Patient wants to consider CPAP as option versus the oral appliance.   Will follow up with PCP and Gyn first (recommended work up for PCOS).       The patient location is: home  The chief complaint leading to consultation is: continued fatigue    Visit type: audiovisual    Face to Face time with patient: 9  16 minutes of total time spent on the encounter, which includes face to face time and non-face to face time preparing to see the patient (eg, review of tests), Obtaining and/or reviewing separately obtained history, Documenting clinical information in the electronic or other health record, Independently interpreting results (not separately reported) and communicating results to the patient/family/caregiver, or Care coordination (not separately reported).         Each patient to whom he or she provides medical services by telemedicine is:  (1) informed of the relationship between the physician and patient and the respective role of any other health care provider with respect to management of the patient; and (2) notified that he or she may decline to receive medical services by telemedicine and may withdraw from such care at any time.    Notes:        "

## 2020-12-15 NOTE — PROGRESS NOTES
"Subjective:       Patient ID: Linda Bradford is a 20 y.o. female.    Chief Complaint: Establish Care    HPI    Mom on speaker phone during visit.    20yoF PMHx SVT s/p radiofreq ablation (2013 at [facility]), Deviated nasal septum s/p septoplasty to est care. Goes to college in [place].     Persistent exhaustion.   Night sweats x1m  Swollen lymph node x 1-2 yrs since dx of mono, "never went away."  Decreased appetite.  Easy bruising.    Takes provigil 200 bid. Sometimes takes as prescribed, sometimes once daily.     Has seen psychiatry in the past. Has been on prozac for possible dx of anx/dep recently but stopped a couple months ago. Then went down the route of sleep disorder.     Had labs recently through [facility] as ordered by pediatrician. Mom will try and message results thru portal. Pt states cbc and thyroid labs were normal. Vitamin d low.     Has seen ENT in the past and has been told of enlarged uvula. Sees Dr. Miller but wants to get second opinion. Has appt today with Dr. Rios. H/o deviated septum surgery.    #Pulm:  - est w/ NP Jasbir    #Sleep med: TIM (using dental device; dx 2020)  - agusto w/ Dr. Dillon  - last sleep study 12/4/20    #Obgyn:  - agusto w/ Dr. Mc    Review of Systems   Constitutional: Positive for appetite change and fatigue. Negative for fever.   HENT: Negative for congestion.    Respiratory: Negative for shortness of breath.    Gastrointestinal: Negative for abdominal pain, constipation, diarrhea and vomiting.   Genitourinary: Negative for difficulty urinating.   Neurological: Positive for weakness and headaches.   Psychiatric/Behavioral: Positive for sleep disturbance.         Past Medical History:   Diagnosis Date    Supraventricular tachycardia         Prior to Admission medications    Medication Sig Start Date End Date Taking? Authorizing Provider   gabapentin (NEURONTIN) 300 MG capsule  4/13/20   Historical Provider   ibuprofen (ADVIL,MOTRIN) 400 MG tablet Take 400 mg by " "mouth.    Historical Provider   methylphenidate HCl (RITALIN) 5 MG tablet Take 1-2 tablet by mouth in afternoon as needed for hypersomnia 8/20/20   Theresa Martell NP   modafiniL (PROVIGIL) 200 MG Tab Take 1 tablet (200 mg total) by mouth 2 (two) times daily. 10/20/20   Melissa Dillon MD        Past medical history, surgical history, and family medical history reviewed and updated as appropriate.    Medications and allergies reviewed.     Objective:          Vitals:    12/15/20 0808   BP: 126/82   BP Location: Right arm   Patient Position: Sitting   BP Method: Medium (Manual)   Pulse: 88   SpO2: 99%   Weight: 60.8 kg (134 lb 0.6 oz)   Height: 5' 8" (1.727 m)     Body mass index is 20.38 kg/m².  Physical Exam  Vitals signs and nursing note reviewed.   Constitutional:       General: She is not in acute distress.     Appearance: She is not ill-appearing or toxic-appearing.   Eyes:      Extraocular Movements: Extraocular movements intact.   Neck:      Musculoskeletal: Full passive range of motion without pain.      Thyroid: No thyroid mass, thyromegaly or thyroid tenderness.   Cardiovascular:      Rate and Rhythm: Normal rate and regular rhythm.      Pulses: Normal pulses.      Heart sounds: No murmur.   Pulmonary:      Effort: Pulmonary effort is normal. No respiratory distress.   Abdominal:      Palpations: Abdomen is soft.   Lymphadenopathy:      Head:      Right side of head: Submandibular (nontender) adenopathy present. No submental, preauricular or posterior auricular adenopathy.      Left side of head: No submental, submandibular, preauricular or posterior auricular adenopathy.   Neurological:      Mental Status: She is alert and oriented to person, place, and time.   Psychiatric:         Mood and Affect: Mood normal.         Behavior: Behavior normal.      Comments: Appears generally frustrated with medical history and when explaining past diagnoses and workup, will tear up.         No results found for: WBC, HGB, " HCT, PLT, CHOL, TRIG, HDL, LDLDIRECT, ALT, AST, NA, K, CL, CREATININE, BUN, CO2, TSH, PSA, INR, GLUF, HGBA1C, MICROALBUR    Assessment:       1. Encounter to establish care with new doctor    2. TIM (obstructive sleep apnea)    3. Fatigue, unspecified type    4. Decreased appetite        Plan:   Linda was seen today for establish care.    Diagnoses and all orders for this visit:    Difficult case on initial visit. Used today's visit to go over recent workup and medical history as best as we could with the aid of mother on speaker phone. Pt recently had labs drawn thru [facility], records not available to view and family will try and upload to [facility] as soon as can for review as this would've been my first steps today.     Could some of these symptoms be related to adverse effects of provigil?    Are symptoms related to anxiety / depression?     Will be seeing ENT today for second opinion as far as source of cause of TIM    Follow up should symptoms persist or worsen. If symptoms become severe, please report immediately to urgent care or emergency room for further evaluation. Patient voiced understanding.      Encounter to establish care with new doctor    TIM (obstructive sleep apnea)    Fatigue, unspecified type    Decreased appetite      No follow-ups on file.    Robert Leo MD  Family Medicine  [facility]  12/15/2020

## 2020-12-15 NOTE — TELEPHONE ENCOUNTER
----- Message from Ivanna Galeas sent at 12/15/2020  4:00 PM CST -----      Name of Who is Calling: JAIRO FREITAS [[id number]]      What is the request in detail: Pt returned call to the office.Please contact to further discuss and advise.          Can the clinic reply by [facility]: N      What Number to Call Back [facility]: [phone]

## 2020-12-15 NOTE — PROGRESS NOTES
Ms. Bradford     Vitals:    12/15/20 1054   BP: 105/66   Pulse: 106   Temp: 98.2 °F (36.8 °C)       Chief Complaint:  sleep apnea       HPI:  is a 20-year-old white female who presents for 2nd opinion for possible sleep apnea.  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  She underwent a sleep study in [place] in 2019 which showed no evidence of TIM.  She has also seen various psychologist and psychiatrist to rule out stress and anxiety as a cause.  She ultimately was evaluated by  of Sleep Medicine and underwent a sleep study in July of 2020 which showed positive evidence of TIM.  She obtained a dental device which did seem to help her and when retested showed improvement in her TIM findings though she still complains of issues sleeping.  She is a patient of 's and had undergone septoplasty by him 2 years ago.  She has recently established a relationship with  who will be her PCP.  She had undergone an adenoidectomy at age 9.  She denies any nasal airway obstruction during the day.  She denies any sinus issues or allergy issues.    Review of Systems:  Constitutional:   weight loss or weight gain: Negative  Allergy/Immunologic:   Negative  Nasal Congestion/Obstruction:   Negative  Nosebleeds:   Negative  Sinus infections:   Negative  Headache/Facial Pain:   Negative  Snoring/TIM:   Positive as above  Throat: Infections/Pain:   Negative  Hoarseness/Speech Disturbance:   Negative  Trauma Hx:  Negative    Cardiovascular:  M/I Angina: Negative  Hypertension: Negative  Endocrine:    DM/Steroids: Negative  GI:   Dysphagia/Reflux: Negative  :   GYN Pregnancy: Negative  Renal:   Dialysis: Negative  Lymphatic:   Neck Mass/Lymphadenopathy: Negative  Muscoloskeletal:   Negative  Hematologic:   Bleeding Disorders/Anemia: Negative  Neurologic:    Cranial/Neuralgia: Negative  Pulmonary:   Asthma/SOB/Cough:  Negative  Skin Disorders: Negative    Past Medical/Surgical/Family/Social History:    ENT Surgery: Negative  Occupational Exposure: Negative   Problems: Negative  Cancer: Negative    Past Family History:   Family history of Cancer: Negative    Past Social History:   Tobacco: Nonsmoker   Alcohol: Social Drinker      Allergies and medications: Reviewed per med card.    Physical Examination:  Ears:   External auditory canals:  Clear   Hearing: Grossly intact   Tympanic Membranes: Clear  Nose:   External:  Shows good valve support.   Intranasal:  Her septum is straight turbinates 1+, nasal airway clear  Mouth:   Intraorally: Lips, teeth, and gums: Normal other than mild overbite.   Oropharynx: Normal   Mucosa: Normal   Tongue: Normal  Throat:      Palate: Normal palate with elevation, Mallampati 1   Tonsils:  Minimal   Posterior Pharynx: Normal  Fiberoptic exam: Not performed  Head/Face:     Inspection: Normal and atraumatic   Palpation/Percussion: Non tender   Facial strength: Normal and symmetric   Salivary glands: Normal  Neck: Supple  Thyroid: No masses  Lymphatics: No nodes  Respiratory:   Effort: Normal  Eyes:   Ocular Mobility: Normal   Vision: Grossly intact  Neuro/Psych:   Cranial Nerves: Grossly Intact   Orientation: Normal   Mood/Affect: Normal      Assessment/Plan:  I have discussed my findings with her and her mother in detail as well as my recommendations for treatment.  Anatomically I do not see any issues that would create TIM other than her mild overbite.  We have discussed sinus rinses and I have given her literature on this including and use with distilled water and I have described how this is to be used.  I have suggested that she continue her evaluation with her PCP regarding hormonal and other metabolic possibilities and follow-up with sleep medicine regarding further testing.  She will return to clinic here p.r.n.

## 2020-12-16 ENCOUNTER — PATIENT MESSAGE (OUTPATIENT)
Dept: INTERNAL MEDICINE | Facility: CLINIC | Age: 20
End: 2020-12-16

## 2021-01-07 ENCOUNTER — OFFICE VISIT (OUTPATIENT)
Dept: INTERNAL MEDICINE | Facility: CLINIC | Age: 21
End: 2021-01-07
Payer: COMMERCIAL

## 2021-01-07 DIAGNOSIS — R53.83 FATIGUE, UNSPECIFIED TYPE: Primary | ICD-10-CM

## 2021-01-07 DIAGNOSIS — B27.90 CHRONIC EBV INFECTION: ICD-10-CM

## 2021-01-07 DIAGNOSIS — R59.0 SUBMANDIBULAR LYMPHADENOPATHY: ICD-10-CM

## 2021-01-07 PROCEDURE — 99214 OFFICE O/P EST MOD 30 MIN: CPT | Mod: 95,,, | Performed by: FAMILY MEDICINE

## 2021-01-07 PROCEDURE — 99214 PR OFFICE/OUTPT VISIT, EST, LEVL IV, 30-39 MIN: ICD-10-PCS | Mod: 95,,, | Performed by: FAMILY MEDICINE

## 2021-01-08 ENCOUNTER — TELEPHONE (OUTPATIENT)
Dept: INTERNAL MEDICINE | Facility: CLINIC | Age: 21
End: 2021-01-08

## 2021-01-08 ENCOUNTER — PATIENT MESSAGE (OUTPATIENT)
Dept: INTERNAL MEDICINE | Facility: CLINIC | Age: 21
End: 2021-01-08

## 2021-01-08 ENCOUNTER — TELEPHONE (OUTPATIENT)
Dept: ENDOCRINOLOGY | Facility: CLINIC | Age: 21
End: 2021-01-08

## 2021-01-08 DIAGNOSIS — R59.0 SUBMANDIBULAR LYMPHADENOPATHY: ICD-10-CM

## 2021-01-08 DIAGNOSIS — B27.90 CHRONIC EBV INFECTION: Primary | ICD-10-CM

## 2021-01-10 ENCOUNTER — PATIENT OUTREACH (OUTPATIENT)
Dept: ADMINISTRATIVE | Facility: OTHER | Age: 21
End: 2021-01-10

## 2021-01-11 ENCOUNTER — OFFICE VISIT (OUTPATIENT)
Dept: ENDOCRINOLOGY | Facility: CLINIC | Age: 21
End: 2021-01-11
Payer: COMMERCIAL

## 2021-01-11 VITALS
WEIGHT: 137.44 LBS | HEART RATE: 93 BPM | OXYGEN SATURATION: 99 % | BODY MASS INDEX: 20.83 KG/M2 | SYSTOLIC BLOOD PRESSURE: 120 MMHG | RESPIRATION RATE: 17 BRPM | DIASTOLIC BLOOD PRESSURE: 58 MMHG | HEIGHT: 68 IN

## 2021-01-11 DIAGNOSIS — R53.83 FATIGUE: Primary | ICD-10-CM

## 2021-01-11 DIAGNOSIS — G47.33 OSA (OBSTRUCTIVE SLEEP APNEA): ICD-10-CM

## 2021-01-11 DIAGNOSIS — R53.83 FATIGUE, UNSPECIFIED TYPE: Primary | ICD-10-CM

## 2021-01-11 PROCEDURE — 99244 PR OFFICE CONSULTATION,LEVEL IV: ICD-10-PCS | Mod: S$GLB,,, | Performed by: INTERNAL MEDICINE

## 2021-01-11 PROCEDURE — 99999 PR PBB SHADOW E&M-EST. PATIENT-LVL IV: ICD-10-PCS | Mod: PBBFAC,,, | Performed by: INTERNAL MEDICINE

## 2021-01-11 PROCEDURE — 99999 PR PBB SHADOW E&M-EST. PATIENT-LVL IV: CPT | Mod: PBBFAC,,, | Performed by: INTERNAL MEDICINE

## 2021-01-11 PROCEDURE — 99244 OFF/OP CNSLTJ NEW/EST MOD 40: CPT | Mod: S$GLB,,, | Performed by: INTERNAL MEDICINE

## 2021-01-11 PROCEDURE — 3008F PR BODY MASS INDEX (BMI) DOCUMENTED: ICD-10-PCS | Mod: CPTII,S$GLB,, | Performed by: INTERNAL MEDICINE

## 2021-01-11 PROCEDURE — 3008F BODY MASS INDEX DOCD: CPT | Mod: CPTII,S$GLB,, | Performed by: INTERNAL MEDICINE

## 2021-01-11 PROCEDURE — 1126F PR PAIN SEVERITY QUANTIFIED, NO PAIN PRESENT: ICD-10-PCS | Mod: S$GLB,,, | Performed by: INTERNAL MEDICINE

## 2021-01-11 PROCEDURE — 1126F AMNT PAIN NOTED NONE PRSNT: CPT | Mod: S$GLB,,, | Performed by: INTERNAL MEDICINE

## 2021-01-12 ENCOUNTER — HOSPITAL ENCOUNTER (OUTPATIENT)
Dept: RADIOLOGY | Facility: HOSPITAL | Age: 21
Discharge: HOME | End: 2021-01-12
Attending: FAMILY MEDICINE
Payer: COMMERCIAL

## 2021-01-12 DIAGNOSIS — R59.0 SUBMANDIBULAR LYMPHADENOPATHY: ICD-10-CM

## 2021-01-12 DIAGNOSIS — B27.90 CHRONIC EBV INFECTION: ICD-10-CM

## 2021-01-12 PROCEDURE — 71260 CT NECK CHEST WITH CONTRAST (XPD): ICD-10-PCS | Mod: 26,,, | Performed by: RADIOLOGY

## 2021-01-12 PROCEDURE — 71260 CT THORAX DX C+: CPT | Mod: 26,,, | Performed by: RADIOLOGY

## 2021-01-12 PROCEDURE — 70491 CT SOFT TISSUE NECK W/DYE: CPT | Mod: TC

## 2021-01-12 PROCEDURE — 70491 CT NECK CHEST WITH CONTRAST (XPD): ICD-10-PCS | Mod: 26,,, | Performed by: RADIOLOGY

## 2021-01-12 PROCEDURE — 25500020 PHARM REV CODE 255: Performed by: FAMILY MEDICINE

## 2021-01-12 PROCEDURE — 70491 CT SOFT TISSUE NECK W/DYE: CPT | Mod: 26,,, | Performed by: RADIOLOGY

## 2021-01-12 RX ADMIN — IOHEXOL 100 ML: 350 INJECTION, SOLUTION INTRAVENOUS at 04:01

## 2021-01-14 ENCOUNTER — PATIENT MESSAGE (OUTPATIENT)
Dept: ENDOCRINOLOGY | Facility: CLINIC | Age: 21
End: 2021-01-14

## 2021-01-14 ENCOUNTER — TELEPHONE (OUTPATIENT)
Dept: HEMATOLOGY/ONCOLOGY | Facility: CLINIC | Age: 21
End: 2021-01-14

## 2021-01-14 ENCOUNTER — LAB VISIT (OUTPATIENT)
Dept: LAB | Facility: HOSPITAL | Age: 21
End: 2021-01-14
Attending: INTERNAL MEDICINE
Payer: COMMERCIAL

## 2021-01-14 ENCOUNTER — OFFICE VISIT (OUTPATIENT)
Dept: HEMATOLOGY/ONCOLOGY | Facility: CLINIC | Age: 21
End: 2021-01-14
Payer: COMMERCIAL

## 2021-01-14 VITALS
BODY MASS INDEX: 21.35 KG/M2 | WEIGHT: 140.88 LBS | SYSTOLIC BLOOD PRESSURE: 121 MMHG | DIASTOLIC BLOOD PRESSURE: 69 MMHG | TEMPERATURE: 99 F | RESPIRATION RATE: 16 BRPM | HEIGHT: 68 IN | HEART RATE: 98 BPM

## 2021-01-14 DIAGNOSIS — B27.90 CHRONIC EBV INFECTION: ICD-10-CM

## 2021-01-14 DIAGNOSIS — R59.0 SUBMANDIBULAR LYMPHADENOPATHY: Primary | ICD-10-CM

## 2021-01-14 DIAGNOSIS — R09.A2 GLOBUS SENSATION: ICD-10-CM

## 2021-01-14 DIAGNOSIS — R59.0 SUBMANDIBULAR LYMPHADENOPATHY: ICD-10-CM

## 2021-01-14 DIAGNOSIS — I47.10 SVT (SUPRAVENTRICULAR TACHYCARDIA): ICD-10-CM

## 2021-01-14 DIAGNOSIS — R53.83 FATIGUE, UNSPECIFIED TYPE: ICD-10-CM

## 2021-01-14 LAB
CORTIS SERPL-MCNC: 17.9 UG/DL (ref 4.3–22.4)
LDH SERPL L TO P-CCNC: 164 U/L (ref 110–260)

## 2021-01-14 PROCEDURE — 1126F AMNT PAIN NOTED NONE PRSNT: CPT | Mod: S$GLB,,, | Performed by: STUDENT IN AN ORGANIZED HEALTH CARE EDUCATION/TRAINING PROGRAM

## 2021-01-14 PROCEDURE — 82533 TOTAL CORTISOL: CPT

## 2021-01-14 PROCEDURE — 99205 PR OFFICE/OUTPT VISIT, NEW, LEVL V, 60-74 MIN: ICD-10-PCS | Mod: S$GLB,,, | Performed by: STUDENT IN AN ORGANIZED HEALTH CARE EDUCATION/TRAINING PROGRAM

## 2021-01-14 PROCEDURE — 87799 DETECT AGENT NOS DNA QUANT: CPT

## 2021-01-14 PROCEDURE — 36415 COLL VENOUS BLD VENIPUNCTURE: CPT

## 2021-01-14 PROCEDURE — 3008F BODY MASS INDEX DOCD: CPT | Mod: CPTII,S$GLB,, | Performed by: STUDENT IN AN ORGANIZED HEALTH CARE EDUCATION/TRAINING PROGRAM

## 2021-01-14 PROCEDURE — 99999 PR PBB SHADOW E&M-EST. PATIENT-LVL III: CPT | Mod: PBBFAC,,, | Performed by: STUDENT IN AN ORGANIZED HEALTH CARE EDUCATION/TRAINING PROGRAM

## 2021-01-14 PROCEDURE — 3008F PR BODY MASS INDEX (BMI) DOCUMENTED: ICD-10-PCS | Mod: CPTII,S$GLB,, | Performed by: STUDENT IN AN ORGANIZED HEALTH CARE EDUCATION/TRAINING PROGRAM

## 2021-01-14 PROCEDURE — 83615 LACTATE (LD) (LDH) ENZYME: CPT

## 2021-01-14 PROCEDURE — 99205 OFFICE O/P NEW HI 60 MIN: CPT | Mod: S$GLB,,, | Performed by: STUDENT IN AN ORGANIZED HEALTH CARE EDUCATION/TRAINING PROGRAM

## 2021-01-14 PROCEDURE — 1126F PR PAIN SEVERITY QUANTIFIED, NO PAIN PRESENT: ICD-10-PCS | Mod: S$GLB,,, | Performed by: STUDENT IN AN ORGANIZED HEALTH CARE EDUCATION/TRAINING PROGRAM

## 2021-01-14 PROCEDURE — 99999 PR PBB SHADOW E&M-EST. PATIENT-LVL III: ICD-10-PCS | Mod: PBBFAC,,, | Performed by: STUDENT IN AN ORGANIZED HEALTH CARE EDUCATION/TRAINING PROGRAM

## 2021-01-14 RX ORDER — PANTOPRAZOLE SODIUM 20 MG/1
20 TABLET, DELAYED RELEASE ORAL DAILY
Qty: 30 TABLET | Refills: 1 | Status: SHIPPED | OUTPATIENT
Start: 2021-01-14 | End: 2022-05-31

## 2021-01-15 ENCOUNTER — TELEPHONE (OUTPATIENT)
Dept: HEMATOLOGY/ONCOLOGY | Facility: HOSPITAL | Age: 21
End: 2021-01-15

## 2021-01-15 DIAGNOSIS — I47.10 SVT (SUPRAVENTRICULAR TACHYCARDIA): Primary | ICD-10-CM

## 2021-01-16 LAB — EBV DNA SERPL NAA+PROBE-ACNC: NORMAL IU/ML

## 2021-01-19 ENCOUNTER — TELEPHONE (OUTPATIENT)
Dept: CARDIOLOGY | Facility: HOSPITAL | Age: 21
End: 2021-01-19

## 2021-01-20 ENCOUNTER — OFFICE VISIT (OUTPATIENT)
Dept: OTOLARYNGOLOGY | Facility: CLINIC | Age: 21
End: 2021-01-20
Payer: COMMERCIAL

## 2021-01-20 VITALS
TEMPERATURE: 98 F | BODY MASS INDEX: 21.05 KG/M2 | SYSTOLIC BLOOD PRESSURE: 105 MMHG | HEART RATE: 70 BPM | DIASTOLIC BLOOD PRESSURE: 70 MMHG | HEIGHT: 68 IN | WEIGHT: 138.88 LBS

## 2021-01-20 DIAGNOSIS — J32.9 CHRONIC RECURRENT SINUSITIS: Primary | ICD-10-CM

## 2021-01-20 PROCEDURE — 1126F AMNT PAIN NOTED NONE PRSNT: CPT | Mod: S$GLB,,, | Performed by: OTOLARYNGOLOGY

## 2021-01-20 PROCEDURE — 99214 PR OFFICE/OUTPT VISIT, EST, LEVL IV, 30-39 MIN: ICD-10-PCS | Mod: S$GLB,,, | Performed by: OTOLARYNGOLOGY

## 2021-01-20 PROCEDURE — 3008F PR BODY MASS INDEX (BMI) DOCUMENTED: ICD-10-PCS | Mod: CPTII,S$GLB,, | Performed by: OTOLARYNGOLOGY

## 2021-01-20 PROCEDURE — 1126F PR PAIN SEVERITY QUANTIFIED, NO PAIN PRESENT: ICD-10-PCS | Mod: S$GLB,,, | Performed by: OTOLARYNGOLOGY

## 2021-01-20 PROCEDURE — 3008F BODY MASS INDEX DOCD: CPT | Mod: CPTII,S$GLB,, | Performed by: OTOLARYNGOLOGY

## 2021-01-20 PROCEDURE — 99214 OFFICE O/P EST MOD 30 MIN: CPT | Mod: S$GLB,,, | Performed by: OTOLARYNGOLOGY

## 2021-01-21 ENCOUNTER — TELEPHONE (OUTPATIENT)
Dept: SLEEP MEDICINE | Facility: CLINIC | Age: 21
End: 2021-01-21

## 2021-01-21 ENCOUNTER — PATIENT MESSAGE (OUTPATIENT)
Dept: SLEEP MEDICINE | Facility: CLINIC | Age: 21
End: 2021-01-21

## 2021-01-22 ENCOUNTER — TELEPHONE (OUTPATIENT)
Dept: SLEEP MEDICINE | Facility: CLINIC | Age: 21
End: 2021-01-22

## 2021-01-23 ENCOUNTER — PATIENT MESSAGE (OUTPATIENT)
Dept: OTOLARYNGOLOGY | Facility: CLINIC | Age: 21
End: 2021-01-23

## 2021-01-25 ENCOUNTER — TELEPHONE (OUTPATIENT)
Dept: SLEEP MEDICINE | Facility: CLINIC | Age: 21
End: 2021-01-25

## 2021-02-05 ENCOUNTER — PATIENT MESSAGE (OUTPATIENT)
Dept: INTERNAL MEDICINE | Facility: CLINIC | Age: 21
End: 2021-02-05

## 2021-02-05 ENCOUNTER — TELEPHONE (OUTPATIENT)
Dept: INTERNAL MEDICINE | Facility: CLINIC | Age: 21
End: 2021-02-05

## 2021-02-11 ENCOUNTER — PATIENT MESSAGE (OUTPATIENT)
Dept: HEMATOLOGY/ONCOLOGY | Facility: CLINIC | Age: 21
End: 2021-02-11

## 2021-02-17 ENCOUNTER — PATIENT MESSAGE (OUTPATIENT)
Dept: OBSTETRICS AND GYNECOLOGY | Facility: CLINIC | Age: 21
End: 2021-02-17

## 2021-02-23 ENCOUNTER — PATIENT MESSAGE (OUTPATIENT)
Dept: SLEEP MEDICINE | Facility: CLINIC | Age: 21
End: 2021-02-23

## 2021-02-23 DIAGNOSIS — G47.33 OSA (OBSTRUCTIVE SLEEP APNEA): Primary | ICD-10-CM

## 2021-02-23 DIAGNOSIS — G47.10 HYPERSOMNIA: ICD-10-CM

## 2021-02-24 ENCOUNTER — PATIENT MESSAGE (OUTPATIENT)
Dept: SLEEP MEDICINE | Facility: CLINIC | Age: 21
End: 2021-02-24

## 2021-03-12 ENCOUNTER — PATIENT MESSAGE (OUTPATIENT)
Dept: SLEEP MEDICINE | Facility: CLINIC | Age: 21
End: 2021-03-12

## 2021-03-12 ENCOUNTER — TELEPHONE (OUTPATIENT)
Dept: SLEEP MEDICINE | Facility: CLINIC | Age: 21
End: 2021-03-12

## 2021-03-13 ENCOUNTER — PATIENT MESSAGE (OUTPATIENT)
Dept: SLEEP MEDICINE | Facility: CLINIC | Age: 21
End: 2021-03-13

## 2021-03-13 DIAGNOSIS — G47.10 HYPERSOMNIA: ICD-10-CM

## 2021-03-15 RX ORDER — MODAFINIL 200 MG/1
200 TABLET ORAL 2 TIMES DAILY
Qty: 60 TABLET | Refills: 3 | Status: SHIPPED | OUTPATIENT
Start: 2021-03-15 | End: 2022-01-07

## 2021-05-13 ENCOUNTER — TELEPHONE (OUTPATIENT)
Dept: OTOLARYNGOLOGY | Facility: CLINIC | Age: 21
End: 2021-05-13

## 2021-06-14 ENCOUNTER — TELEPHONE (OUTPATIENT)
Dept: OTOLARYNGOLOGY | Facility: CLINIC | Age: 21
End: 2021-06-14

## 2021-07-02 ENCOUNTER — TELEPHONE (OUTPATIENT)
Dept: INTERNAL MEDICINE | Facility: CLINIC | Age: 21
End: 2021-07-02

## 2021-07-07 ENCOUNTER — PATIENT MESSAGE (OUTPATIENT)
Dept: ADMINISTRATIVE | Facility: HOSPITAL | Age: 21
End: 2021-07-07

## 2022-01-05 ENCOUNTER — PATIENT OUTREACH (OUTPATIENT)
Dept: ADMINISTRATIVE | Facility: OTHER | Age: 22
End: 2022-01-05
Payer: COMMERCIAL

## 2022-01-05 NOTE — PROGRESS NOTES
Health Maintenance Due   Topic Date Due    Hepatitis C Screening  Never done    Lipid Panel  Never done    HPV Vaccines (1 - 2-dose series) Never done    HIV Screening  Never done    Pap Smear  Never done    TETANUS VACCINE  06/28/2021    Influenza Vaccine (1) 09/01/2021    COVID-19 Vaccine (3 - Booster for Pfizer series) 10/09/2021     Updates were requested from care everywhere.  Chart was reviewed for overdue [facility] Encounters (HOLDEN) topics (CRS, Breast Cancer Screening, Eye exam)  Health Maintenance has been updated.  LINKS immunization registry triggered.  Immunizations were reconciled.

## 2022-01-07 ENCOUNTER — OFFICE VISIT (OUTPATIENT)
Dept: RHEUMATOLOGY | Facility: CLINIC | Age: 22
End: 2022-01-07
Payer: COMMERCIAL

## 2022-01-07 ENCOUNTER — LAB VISIT (OUTPATIENT)
Dept: LAB | Facility: HOSPITAL | Age: 22
End: 2022-01-07
Attending: INTERNAL MEDICINE
Payer: COMMERCIAL

## 2022-01-07 VITALS — WEIGHT: 147.94 LBS | HEIGHT: 66 IN | BODY MASS INDEX: 23.77 KG/M2

## 2022-01-07 DIAGNOSIS — R76.8 POSITIVE ANA (ANTINUCLEAR ANTIBODY): Primary | ICD-10-CM

## 2022-01-07 DIAGNOSIS — M79.10 MYALGIA: ICD-10-CM

## 2022-01-07 DIAGNOSIS — R76.8 POSITIVE ANA (ANTINUCLEAR ANTIBODY): ICD-10-CM

## 2022-01-07 DIAGNOSIS — R53.83 FATIGUE, UNSPECIFIED TYPE: ICD-10-CM

## 2022-01-07 LAB
C3 SERPL-MCNC: 106 MG/DL (ref 50–180)
C4 SERPL-MCNC: 24 MG/DL (ref 11–44)
CK SERPL-CCNC: 90 U/L (ref 20–180)

## 2022-01-07 PROCEDURE — 36415 COLL VENOUS BLD VENIPUNCTURE: CPT | Performed by: INTERNAL MEDICINE

## 2022-01-07 PROCEDURE — 86160 COMPLEMENT ANTIGEN: CPT | Mod: 59 | Performed by: INTERNAL MEDICINE

## 2022-01-07 PROCEDURE — 99204 OFFICE O/P NEW MOD 45 MIN: CPT | Mod: S$GLB,,, | Performed by: INTERNAL MEDICINE

## 2022-01-07 PROCEDURE — 1159F PR MEDICATION LIST DOCUMENTED IN MEDICAL RECORD: ICD-10-PCS | Mod: CPTII,S$GLB,, | Performed by: INTERNAL MEDICINE

## 2022-01-07 PROCEDURE — 1160F RVW MEDS BY RX/DR IN RCRD: CPT | Mod: CPTII,S$GLB,, | Performed by: INTERNAL MEDICINE

## 2022-01-07 PROCEDURE — 99999 PR PBB SHADOW E&M-EST. PATIENT-LVL III: CPT | Mod: PBBFAC,,, | Performed by: INTERNAL MEDICINE

## 2022-01-07 PROCEDURE — 1160F PR REVIEW ALL MEDS BY PRESCRIBER/CLIN PHARMACIST DOCUMENTED: ICD-10-PCS | Mod: CPTII,S$GLB,, | Performed by: INTERNAL MEDICINE

## 2022-01-07 PROCEDURE — 99204 PR OFFICE/OUTPT VISIT, NEW, LEVL IV, 45-59 MIN: ICD-10-PCS | Mod: S$GLB,,, | Performed by: INTERNAL MEDICINE

## 2022-01-07 PROCEDURE — 3008F PR BODY MASS INDEX (BMI) DOCUMENTED: ICD-10-PCS | Mod: CPTII,S$GLB,, | Performed by: INTERNAL MEDICINE

## 2022-01-07 PROCEDURE — 82550 ASSAY OF CK (CPK): CPT | Performed by: INTERNAL MEDICINE

## 2022-01-07 PROCEDURE — 82085 ASSAY OF ALDOLASE: CPT | Performed by: INTERNAL MEDICINE

## 2022-01-07 PROCEDURE — 86160 COMPLEMENT ANTIGEN: CPT | Performed by: INTERNAL MEDICINE

## 2022-01-07 PROCEDURE — 99999 PR PBB SHADOW E&M-EST. PATIENT-LVL III: ICD-10-PCS | Mod: PBBFAC,,, | Performed by: INTERNAL MEDICINE

## 2022-01-07 PROCEDURE — 1159F MED LIST DOCD IN RCRD: CPT | Mod: CPTII,S$GLB,, | Performed by: INTERNAL MEDICINE

## 2022-01-07 PROCEDURE — 3008F BODY MASS INDEX DOCD: CPT | Mod: CPTII,S$GLB,, | Performed by: INTERNAL MEDICINE

## 2022-01-07 RX ORDER — ARMODAFINIL 150 MG/1
150 TABLET ORAL EVERY MORNING
COMMUNITY
Start: 2021-12-24 | End: 2022-05-31

## 2022-01-07 RX ORDER — SPIRONOLACTONE 50 MG/1
50 TABLET, FILM COATED ORAL DAILY
COMMUNITY
Start: 2021-12-24 | End: 2022-05-31

## 2022-01-07 RX ORDER — ACETAMINOPHEN, DEXTROMETHORPHAN HBR, DOXYLAMINE SUCCINATE, PHENYLEPHRINE HCL 650; 20; 12.5; 1 MG/30ML; MG/30ML; MG/30ML; MG/30ML
SOLUTION ORAL
COMMUNITY
End: 2022-05-31

## 2022-01-07 RX ORDER — AMITRIPTYLINE HYDROCHLORIDE 10 MG/1
1 TABLET, FILM COATED ORAL NIGHTLY
COMMUNITY
Start: 2021-09-29 | End: 2022-01-07

## 2022-01-07 NOTE — PROGRESS NOTES
PreChartAnswers for HPI/ROS submitted by the patient on 12/31/2021  fever: No  eye redness: Yes  mouth sores: No  headaches: Yes  shortness of breath: No  chest pain: No  trouble swallowing: Yes  diarrhea: No  constipation: No  unexpected weight change: No  genital sore: No  dysuria: No  During the last 3 days, have you had a skin rash?: Yes  Bruises or bleeds easily: Yes  cough: No

## 2022-01-09 NOTE — PROGRESS NOTES
History of present illness:  21-year-old female has a 3 month history of fatigue.  This followed an EBV infection.  She had lymphadenopathy.  She admits to not sleeping through the night.  She had been treated in the past with amitriptyline but this did not help.  She has been on Nuvigil which has helped her fatigue.  Her fatigue tends to be worse during the day.  She does not have to nap.    One year ago she developed joint aching.  Her pain is worse during the day.  She has some pain in the morning.  She has no morning stiffness.  She denies any nocturnal pain.  She complains of occasional swelling in her hands and feet which last less than several hours.  There is no erythema or increased warmth accompanying the swelling.    She was evaluated by Dr. 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  She had a positive ANGELIA but a negative ANGELIA profile.  He did not find any evidence of lupus or other connective tissue disease.  He thought she might have a hypermobility syndrome.    She takes Advil once or twice a week.  It does help when she takes it.  She has some response to ice but not heat or topical medications.    She has no unexplained fevers.  She has a history of migraines but these have not bothered her recently.  She also has a daily frontal headache.  She has no rashes.  Her fatigue is worse when she is in the sun.  She has no other evidence of photosensitivity.  She has no conjunctivitis, oral ulcers, dry eye or mouth, Raynaud's phenomena, pleurisy, shortness of breath, chronic or bloody diarrhea, vaginal discharge or ulcers.  She has no numbness or tingling.  She has no thrombophlebitis.  She has never been pregnant.  Her grandmother has with sounds like osteoarthritis.    Systems review:  General:  Weight has been stable  GI:  No abdominal pain or peptic ulcer disease.  No liver problems.  :  No kidney or bladder problems    Physical examination:  Skin:  No rashes  ENT:  Adequate tears in saliva.  No  conjunctivitis or oral ulcers.  Chest:  Clear to auscultation and percussion  Cardiac:  No murmurs, gallops, rubs  Abdomen:  No organomegaly or masses.  No tenderness to palpation  Extremities:  No pedal edema  Musculoskeletal:  She has full range of motion of all joints.  She has no evidence of joint hyper extensibility.  She has no soft tissue swelling, erythema, or increased warmth.  She has scattered tender areas in nonarticular areas.  Neurologic:  Normal muscle strength testing    Assessment:  1. Chronic fatigue syndrome, status post TV the infection  2. Diffuse myalgias  3. Positive ANGELIA without evidence of underlying connective tissue disease.    Plans:  1. Further laboratory studies obtained  2. Continue Advil as needed  3. Return to see me as needed.      Answers for HPI/ROS submitted by the patient on 12/31/2021  fever: No  eye redness: Yes  mouth sores: No  headaches: Yes  shortness of breath: No  chest pain: No  trouble swallowing: Yes  diarrhea: No  constipation: No  unexpected weight change: No  genital sore: No  dysuria: No  During the last 3 days, have you had a skin rash?: Yes  Bruises or bleeds easily: Yes  cough: No

## 2022-01-10 LAB — ALDOLASE SERPL-CCNC: 2.7 U/L (ref 1.2–7.6)

## 2022-01-26 ENCOUNTER — PATIENT MESSAGE (OUTPATIENT)
Dept: ADMINISTRATIVE | Facility: HOSPITAL | Age: 22
End: 2022-01-26
Payer: COMMERCIAL

## 2022-03-16 ENCOUNTER — PATIENT MESSAGE (OUTPATIENT)
Dept: ADMINISTRATIVE | Facility: HOSPITAL | Age: 22
End: 2022-03-16
Payer: COMMERCIAL

## 2022-05-31 ENCOUNTER — CLINICAL SUPPORT (OUTPATIENT)
Dept: INTERNAL MEDICINE | Facility: CLINIC | Age: 22
End: 2022-05-31

## 2022-05-31 ENCOUNTER — OFFICE VISIT (OUTPATIENT)
Dept: INTERNAL MEDICINE | Facility: CLINIC | Age: 22
End: 2022-05-31
Payer: COMMERCIAL

## 2022-05-31 ENCOUNTER — CLINICAL SUPPORT (OUTPATIENT)
Dept: INTERNAL MEDICINE | Facility: CLINIC | Age: 22
End: 2022-05-31
Payer: COMMERCIAL

## 2022-05-31 VITALS
HEART RATE: 73 BPM | WEIGHT: 147.69 LBS | DIASTOLIC BLOOD PRESSURE: 68 MMHG | HEIGHT: 68 IN | BODY MASS INDEX: 22.38 KG/M2 | SYSTOLIC BLOOD PRESSURE: 106 MMHG | TEMPERATURE: 99 F

## 2022-05-31 DIAGNOSIS — Z00.00 ANNUAL PHYSICAL EXAM: Primary | ICD-10-CM

## 2022-05-31 DIAGNOSIS — E04.9 THYROID ENLARGED: ICD-10-CM

## 2022-05-31 DIAGNOSIS — M25.531 CHRONIC WRIST PAIN, RIGHT: ICD-10-CM

## 2022-05-31 DIAGNOSIS — Z00.00 ROUTINE GENERAL MEDICAL EXAMINATION AT A HEALTH CARE FACILITY: Primary | ICD-10-CM

## 2022-05-31 DIAGNOSIS — R13.12 OROPHARYNGEAL DYSPHAGIA: ICD-10-CM

## 2022-05-31 DIAGNOSIS — G89.29 CHRONIC WRIST PAIN, RIGHT: ICD-10-CM

## 2022-05-31 DIAGNOSIS — G47.11 IDIOPATHIC HYPERSOMNOLENCE: ICD-10-CM

## 2022-05-31 LAB
ALBUMIN SERPL BCP-MCNC: 4.2 G/DL (ref 3.5–5.2)
ALP SERPL-CCNC: 59 U/L (ref 55–135)
ALT SERPL W/O P-5'-P-CCNC: 12 U/L (ref 10–44)
ANION GAP SERPL CALC-SCNC: 7 MMOL/L (ref 8–16)
AST SERPL-CCNC: 18 U/L (ref 10–40)
BILIRUB SERPL-MCNC: 0.6 MG/DL (ref 0.1–1)
BUN SERPL-MCNC: 15 MG/DL (ref 6–20)
CALCIUM SERPL-MCNC: 9.5 MG/DL (ref 8.7–10.5)
CHLORIDE SERPL-SCNC: 106 MMOL/L (ref 95–110)
CHOLEST SERPL-MCNC: 164 MG/DL (ref 120–199)
CHOLEST/HDLC SERPL: 2.4 {RATIO} (ref 2–5)
CO2 SERPL-SCNC: 25 MMOL/L (ref 23–29)
CREAT SERPL-MCNC: 0.9 MG/DL (ref 0.5–1.4)
ERYTHROCYTE [DISTWIDTH] IN BLOOD BY AUTOMATED COUNT: 12.5 % (ref 11.5–14.5)
EST. GFR  (AFRICAN AMERICAN): >60 ML/MIN/1.73 M^2
EST. GFR  (NON AFRICAN AMERICAN): >60 ML/MIN/1.73 M^2
ESTIMATED AVG GLUCOSE: 80 MG/DL (ref 68–131)
GLUCOSE SERPL-MCNC: 92 MG/DL (ref 70–110)
HBA1C MFR BLD: 4.4 % (ref 4–5.6)
HCT VFR BLD AUTO: 37.5 % (ref 37–48.5)
HDLC SERPL-MCNC: 67 MG/DL (ref 40–75)
HDLC SERPL: 40.9 % (ref 20–50)
HGB BLD-MCNC: 13.2 G/DL (ref 12–16)
LDLC SERPL CALC-MCNC: 87.6 MG/DL (ref 63–159)
MCH RBC QN AUTO: 30 PG (ref 27–31)
MCHC RBC AUTO-ENTMCNC: 35.2 G/DL (ref 32–36)
MCV RBC AUTO: 85 FL (ref 82–98)
NONHDLC SERPL-MCNC: 97 MG/DL
PLATELET # BLD AUTO: 205 K/UL (ref 150–450)
PMV BLD AUTO: 11 FL (ref 9.2–12.9)
POTASSIUM SERPL-SCNC: 4 MMOL/L (ref 3.5–5.1)
PROT SERPL-MCNC: 7.2 G/DL (ref 6–8.4)
RBC # BLD AUTO: 4.4 M/UL (ref 4–5.4)
SODIUM SERPL-SCNC: 138 MMOL/L (ref 136–145)
TRIGL SERPL-MCNC: 47 MG/DL (ref 30–150)
TSH SERPL DL<=0.005 MIU/L-ACNC: 0.63 UIU/ML (ref 0.4–4)
WBC # BLD AUTO: 4.76 K/UL (ref 3.9–12.7)

## 2022-05-31 PROCEDURE — 99999 PR PBB SHADOW E&M-EST. PATIENT-LVL III: CPT | Mod: PBBFAC,,, | Performed by: INTERNAL MEDICINE

## 2022-05-31 PROCEDURE — 99385 PREV VISIT NEW AGE 18-39: CPT | Mod: 25,S$GLB,, | Performed by: INTERNAL MEDICINE

## 2022-05-31 PROCEDURE — 1159F PR MEDICATION LIST DOCUMENTED IN MEDICAL RECORD: ICD-10-PCS | Mod: CPTII,S$GLB,, | Performed by: INTERNAL MEDICINE

## 2022-05-31 PROCEDURE — 90471 IMMUNIZATION ADMIN: CPT | Mod: S$GLB,,, | Performed by: INTERNAL MEDICINE

## 2022-05-31 PROCEDURE — 3074F PR MOST RECENT SYSTOLIC BLOOD PRESSURE < 130 MM HG: ICD-10-PCS | Mod: CPTII,S$GLB,, | Performed by: INTERNAL MEDICINE

## 2022-05-31 PROCEDURE — 90471 TDAP VACCINE GREATER THAN OR EQUAL TO 7YO IM: ICD-10-PCS | Mod: S$GLB,,, | Performed by: INTERNAL MEDICINE

## 2022-05-31 PROCEDURE — 3044F PR MOST RECENT HEMOGLOBIN A1C LEVEL <7.0%: ICD-10-PCS | Mod: CPTII,S$GLB,, | Performed by: INTERNAL MEDICINE

## 2022-05-31 PROCEDURE — 83036 HEMOGLOBIN GLYCOSYLATED A1C: CPT | Performed by: INTERNAL MEDICINE

## 2022-05-31 PROCEDURE — 99999 PR PBB SHADOW E&M-EST. PATIENT-LVL I: ICD-10-PCS | Mod: PBBFAC,,,

## 2022-05-31 PROCEDURE — 3008F PR BODY MASS INDEX (BMI) DOCUMENTED: ICD-10-PCS | Mod: CPTII,S$GLB,, | Performed by: INTERNAL MEDICINE

## 2022-05-31 PROCEDURE — 99999 PR PBB SHADOW E&M-EST. PATIENT-LVL I: ICD-10-PCS | Mod: CHM,PBBFAC,,

## 2022-05-31 PROCEDURE — 84443 ASSAY THYROID STIM HORMONE: CPT | Performed by: INTERNAL MEDICINE

## 2022-05-31 PROCEDURE — 3044F HG A1C LEVEL LT 7.0%: CPT | Mod: CPTII,S$GLB,, | Performed by: INTERNAL MEDICINE

## 2022-05-31 PROCEDURE — 80061 LIPID PANEL: CPT | Performed by: INTERNAL MEDICINE

## 2022-05-31 PROCEDURE — 99999 PR PBB SHADOW E&M-EST. PATIENT-LVL I: CPT | Mod: CHM,PBBFAC,,

## 2022-05-31 PROCEDURE — 3074F SYST BP LT 130 MM HG: CPT | Mod: CPTII,S$GLB,, | Performed by: INTERNAL MEDICINE

## 2022-05-31 PROCEDURE — 99999 PR PBB SHADOW E&M-EST. PATIENT-LVL III: ICD-10-PCS | Mod: PBBFAC,,, | Performed by: INTERNAL MEDICINE

## 2022-05-31 PROCEDURE — 99385 PR PREVENTIVE VISIT,NEW,18-39: ICD-10-PCS | Mod: 25,S$GLB,, | Performed by: INTERNAL MEDICINE

## 2022-05-31 PROCEDURE — 1159F MED LIST DOCD IN RCRD: CPT | Mod: CPTII,S$GLB,, | Performed by: INTERNAL MEDICINE

## 2022-05-31 PROCEDURE — 85027 COMPLETE CBC AUTOMATED: CPT | Performed by: INTERNAL MEDICINE

## 2022-05-31 PROCEDURE — 90715 TDAP VACCINE GREATER THAN OR EQUAL TO 7YO IM: ICD-10-PCS | Mod: S$GLB,,, | Performed by: INTERNAL MEDICINE

## 2022-05-31 PROCEDURE — 3078F DIAST BP <80 MM HG: CPT | Mod: CPTII,S$GLB,, | Performed by: INTERNAL MEDICINE

## 2022-05-31 PROCEDURE — 36415 COLL VENOUS BLD VENIPUNCTURE: CPT | Performed by: INTERNAL MEDICINE

## 2022-05-31 PROCEDURE — 90715 TDAP VACCINE 7 YRS/> IM: CPT | Mod: S$GLB,,, | Performed by: INTERNAL MEDICINE

## 2022-05-31 PROCEDURE — 3008F BODY MASS INDEX DOCD: CPT | Mod: CPTII,S$GLB,, | Performed by: INTERNAL MEDICINE

## 2022-05-31 PROCEDURE — 80053 COMPREHEN METABOLIC PANEL: CPT | Performed by: INTERNAL MEDICINE

## 2022-05-31 PROCEDURE — 99999 PR PBB SHADOW E&M-EST. PATIENT-LVL I: CPT | Mod: PBBFAC,,,

## 2022-05-31 PROCEDURE — 3078F PR MOST RECENT DIASTOLIC BLOOD PRESSURE < 80 MM HG: ICD-10-PCS | Mod: CPTII,S$GLB,, | Performed by: INTERNAL MEDICINE

## 2022-05-31 RX ORDER — FOLIC ACID 0.4 MG
400 TABLET ORAL DAILY
COMMUNITY

## 2022-05-31 RX ORDER — ERGOCALCIFEROL 1.25 MG/1
CAPSULE ORAL
COMMUNITY
End: 2022-05-31

## 2022-05-31 RX ORDER — SPIRONOLACTONE 50 MG/1
TABLET, FILM COATED ORAL
COMMUNITY
Start: 2021-11-28

## 2022-05-31 RX ORDER — ETONOGESTREL 68 MG/1
1 IMPLANT SUBCUTANEOUS
COMMUNITY

## 2022-05-31 RX ORDER — ARMODAFINIL 150 MG/1
TABLET ORAL
COMMUNITY
Start: 2021-12-28 | End: 2022-08-16 | Stop reason: SDUPTHER

## 2022-05-31 NOTE — PROGRESS NOTES
"Subjective:       Patient ID: Linda Bradford is a 22 y.o. female.    Chief Complaint: No chief complaint on file.    HPI   Pt. Has no significant pulmonary history.  Patient has a history of SVT for which she received an ablation in 2012 and is now in normal sinus rhythm.      Physical Limitations:  Patient noted some right sided pain and weakness in her wrist, hip, knee, and ankle from which she is limited when exacerbated.      Current exercise routine:  Patient currently runs for 30 minutes, 3-5 days a week.  Patient participates in a 20-30 minute Gilbert or Pilates class, which consists of full-body resistance training and flexibility, 3-4 days a week.  Patient stretches before and after running.    Goals:  Patient would like to maintain her current weight  Fun Facts:  Patient was very friendly and engaged.  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  She is currently on summer break and works as a  where she is on her feet for 8 - 12 hours at a time.  Patient recently started her Pilates and Gilbert routine and stated that she is interested in "toning".  We reviewed th importance of regular resistance training and she was receptive to all recommendations made.       Review of Systems      Objective:     The fitness evaluation results are as follows:  D.O.S. 5/31/2022   Height (in): 68   Weight (lbs): 145.2   BMI: 22.384291   Body Fat (%): 23.60   Waist (cm): 71   Hip (cm): 98   WHR: 0.72   RBP (mmHg): 90/68   RHR (bpm): 68    Strength R (lbs)t: 66.258721    Strength Lt (lbs): 68.142671   Push-up Assessment: 34   Curl-up Assessment: 52   Flexibility Testing (cm): 33   REE (kcals): 1456       Physical Exam    Assessment:     Age/gender stratified assessment:  Resting BP: Within Normal Limits   Body Fat %: Good   WHR Risk Factor: Low Risk    Strength R: Average    Strength L: Average   Upper Body Endurance: Excellent   Abdominal Endurance: Above Average   Lower " body Flexibiltiy: Good       Problem List Items Addressed This Visit    None     Visit Diagnoses     Routine general medical examination at a health care facility    -  Primary          Plan:       Recommended fitness guidelines:    -150 minutes of moderate intensity aerobic exercise per week or 75 minutes of vigorous intensity aerobic exercise per week.  Make sure that you are reaching 150 minutes of vigorous or 75 minutes of moderate intensity aerobic exercise per week.      -2 to 4 days per week of resistance training for each muscle group.  Make sure that you are keeping your body challenged and progressing your exercise routine by adding sets, repetitions, weight, or switching up an exercise itself about every 6-8 weeks or once an exercise starts to feel too easy for you.      -Daily stretching with a hold of at least 30 seconds per muscle group.

## 2022-05-31 NOTE — PROGRESS NOTES
"Nutrition Assessment  Session Time:  60 minutes  (Annual  Physical)    Client name:  Linda Bradford  :  2000  Age:  22 y.o.  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 She is in town a few days, as it is her Mom's birthday and they will celebrate as a Family going to lunch and dinner.  Shares that she has a history of fatigue and has questionable unconfirmed sleep apnea by 3 different Doctor's. For the fatigue she takes Armodafinil which is a tremendous help. Today Dr. Michael suggested she take folic acid for brain health. In February she began the gluten free diet to determine if it would help her fatigue and she completed 60 days then exams hit and she discontinued as she did not feel major benefits to eliminate some of her enjoyable foods. Shares that she grew up eating healthy and there were no Oreo;s nor sugared coated cereals and when she visited her friends homes, she would eat the Jose mints and chips. Now she has found a common ground which she is pleased with for her food choices. Currently she eats salmon, chicken, ground turkey and beef infrequently and loves seafood. There are textures that she will not consume and those are oats, duck and veal. Today she is here to learn if what she has read and been taught if these foods are indeed healthy such as which is better wheat or almond bread and tortillas, best dry cereal, are fries that terrible. What foods will assist with my energy level?     Anthropometrics  Height:  5'8"     Weight:  145.2#  BMI:  22.12  % Body Fat:  23.60    Clinical Signs/Symptoms  N/V/D:  none  Appetite:  good    Past Medical History:   Diagnosis Date    idiopath hypersom     started on provigil but doing well on nuvigal 2019    Migraine     resolved after nasal surgery    Supraventricular tachycardia     SVT (supraventricular tachycardia) 2012    ablation in [place]       Past " Surgical History:   Procedure Laterality Date    ADENOIDECTOMY      9 yrs    NASAL SEPTUM SURGERY  2019    bone spur , nasal septum    RADIOFREQUENCY ABLATION         Medications    has a current medication list which includes the following prescription(s): armodafinil, ergocalciferol (vitamin d2), nexplanon, folic acid, and spironolactone.    Vitamins, Minerals, and/or Supplements:  Vitamin D (unsure if D3 or D2)     Food/Medication Interactions:  Reviewed     Food Allergies or Intolerances:  Intolerances to coffee and milk which at times causes diarrhea. Cheese and yogurt are tolerated.    Social History    Marital status:  Single  Employment:  Art/Fashion Student in [place]    Social History     Tobacco Use    Smoking status: Never Smoker    Smokeless tobacco: Never Used   Substance Use Topics    Alcohol use: Yes - 4 per week        Lab Reports   Sodium   Date Value Ref Range Status   05/31/2022 138 136 - 145 mmol/L Final     Potassium   Date Value Ref Range Status   05/31/2022 4.0 3.5 - 5.1 mmol/L Final     Chloride   Date Value Ref Range Status   05/31/2022 106 95 - 110 mmol/L Final     CO2   Date Value Ref Range Status   05/31/2022 25 23 - 29 mmol/L Final     Glucose   Date Value Ref Range Status   05/31/2022 92 70 - 110 mg/dL Final     BUN   Date Value Ref Range Status   05/31/2022 15 6 - 20 mg/dL Final     Creatinine   Date Value Ref Range Status   05/31/2022 0.9 0.5 - 1.4 mg/dL Final     Calcium   Date Value Ref Range Status   05/31/2022 9.5 8.7 - 10.5 mg/dL Final     Total Protein   Date Value Ref Range Status   05/31/2022 7.2 6.0 - 8.4 g/dL Final     Albumin   Date Value Ref Range Status   05/31/2022 4.2 3.5 - 5.2 g/dL Final     Total Bilirubin   Date Value Ref Range Status   05/31/2022 0.6 0.1 - 1.0 mg/dL Final     Comment:     For infants and newborns, interpretation of results should be based  on gestational age, weight and in agreement with clinical  observations.    Premature Infant recommended  reference ranges:  Up to 24 hours.............<8.0 mg/dL  Up to 48 hours............<12.0 mg/dL  3-5 days..................<15.0 mg/dL  6-29 days.................<15.0 mg/dL       Alkaline Phosphatase   Date Value Ref Range Status   05/31/2022 59 55 - 135 U/L Final     AST   Date Value Ref Range Status   05/31/2022 18 10 - 40 U/L Final     ALT   Date Value Ref Range Status   05/31/2022 12 10 - 44 U/L Final     Anion Gap   Date Value Ref Range Status   05/31/2022 7 (L) 8 - 16 mmol/L Final     eGFR if    Date Value Ref Range Status   05/31/2022 >60.0 >60 mL/min/1.73 m^2 Final     eGFR if non    Date Value Ref Range Status   05/31/2022 >60.0 >60 mL/min/1.73 m^2 Final     Comment:     Calculation used to obtain the estimated glomerular filtration  rate (eGFR) is the CKD-EPI equation.         Lab Results   Component Value Date    WBC 4.76 05/31/2022    HGB 13.2 05/31/2022    HCT 37.5 05/31/2022    MCV 85 05/31/2022     05/31/2022        Lab Results   Component Value Date    CHOL 164 05/31/2022     Lab Results   Component Value Date    HDL 67 05/31/2022     Lab Results   Component Value Date    LDLCALC 87.6 05/31/2022     Lab Results   Component Value Date    TRIG 47 05/31/2022     Lab Results   Component Value Date    CHOLHDL 40.9 05/31/2022     Lab Results   Component Value Date    HGBA1C 4.4 05/31/2022     BP Readings from Last 1 Encounters:   05/31/22 106/68       Food History  Breakfast:  Egg tacos with argula and hummus, coffee with oat milk  Mid-morning Snack:  Greek yogurt  Lunch:  Apple or popcorn  Mid-afternoon Snack:  none  Dinner:  [place] or chicken with cauliflower, water   H.S. Snack:  Dark chocolate square, 1 de la cruz's cookie  *Fluid intake:  Coffee, oat milk, water, ETOH    Exercise History:  Rides bike daily 30 minutes, Runs outdoors 4-5/wk for 40 minutes, 3/wk 30 minute [place] or Pilates class    Cultural/Spiritual/Personal Preferences:  None identified    Support  System:  parents and friends    State of Change:  Action    Barriers to Change:  None identified    Diagnosis    Food and nutrition related knowledge deficit related to inadequate nutrition education as evidenced by client verbalizes does she need to lose wt., what foods decrease fatigue, are french fries terrible?    Intervention    RMR (Method:  InBody):  1456 kcal  Activity Factor:  1.3  Per REE  BARBRA:  1892 kcal    Goals:  1.  Pair protein + carb food at lunch and snack to provide increased energy. Maintain current wt. Within BMI  2.  Daily fluid goal: 74 oz - 4.5 16 oz glasses healthy fluid  3.  Continue to consume nuts, avocado, plant oils, nut butters, deep ocean fish - good for brain healthy.    Nutrition Education  The following education was provided to the patient:  Complimented patient on proactive role in health maintenance.  Complimented patient on dietary compliance/modifications and resulting health improvements.  Complimented patient on physical activity efforts.  Discussed label reading.  Discussed weight management.  Discussed Heart Healthy Eating.  Suggested dietary modifications based on current dietary behaviors and individual food preferences.  Discussed nutrition-related lab values and dietary and/or lifestyle factors affecting them.  Discussed sugary foods and/or beverages (potential health consequences of excessive sugar intake, ways to reduce sugar intake, healthy beverage alternatives, etc.).  Discussed recommended fiber intake and food sources of such.  Discussed benefits of adequate hydration and recommended fluid intake.  Discussed [facility] client resources (may include but not limited to [facility] Eat Fit Shopping List, Fast Food Guide, Lite Restaurant Guide, and Meal Planning Guide).  Discussed importance of small behavior/habit changes in improving long-term adherence and sustainability.  Discussed goal setting.  Provided ongoing support, encouragement, and guidance toward improved health  efforts.  Client stated no significant benefits from gluten free food therefore it is suggested that she consume whole grains 80% and enjoy french fries infrequently. Reviewed brands of healthy cereals with protein, dark chocolate serving sizes of nuts, avocado, and fries. Wt maintenance is encouraged.    Patient verbalized understanding of nutrition education and recommendations received.    Handouts Provided  Meal Planning Guide  Restaurant Guide  Eat Fit Shopping List  Eat Fit Carline  Fueling Well On-The-Go  Vitamin/Mineral Guide    Monitoring/Evaluation    Monitor the following:  Weight  BMI  % Body Fat  Caloric intake  Labs:  Lipids/HAIC/Vitamin D    Follow Up Plan:  Communication with referring healthcare provider is unnecessary at this time as patient presented as part of annual wellness exam.  However, will follow up with patient in 1-2 years.

## 2022-06-04 NOTE — PROGRESS NOTES
Subjective:       Patient ID: Linda Bradford is a 22 y.o. female.    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  She has a past medical history significant for hypersomnolence syndrome and she also has had an ablation for SVT.  She also has a history of arthralgias and over the past year complains of intermittent right hip pain as well as right wrist pain with some weakness of the wrist.  She is right-handed as well.  She is going away for the summer to study at the [employer] [facility].  Lab values today include a CMP, CBC, lipid profile, TSH as well as a hemoglobin A1c and all are within normal limits.  She is due for a Pap smear and will place a referral today.  She has had her HPV vaccine series and we will attempt to get these records from Dr. Galindo's pediatric clinic.    Review of Systems   Constitutional: Negative for activity change, appetite change, chills, diaphoresis, fatigue, fever and unexpected weight change.   HENT: Positive for trouble swallowing. Negative for nasal congestion, ear pain, mouth sores, postnasal drip, sinus pressure/congestion and sore throat.         On occasion   Eyes: Negative for pain, redness and visual disturbance.   Respiratory: Negative for apnea, cough, chest tightness, shortness of breath and wheezing.    Cardiovascular: Negative for chest pain, palpitations and leg swelling.   Gastrointestinal: Negative for abdominal distention, abdominal pain, blood in stool, constipation, diarrhea, nausea and vomiting.   Endocrine: Negative for cold intolerance, polydipsia, polyphagia and polyuria.   Genitourinary: Negative for difficulty urinating, dysuria, flank pain, frequency, hematuria, menstrual problem, pelvic pain and urgency.   Musculoskeletal: Positive for arthralgias and neck pain. Negative for back pain and joint swelling.        As per HPI with neck and  swallowing  Also : right hip, wrist and hand intermittently with overuse   Integumentary:  Negative for color change, rash and wound.   Neurological: Negative for dizziness, tremors, seizures, syncope, weakness, light-headedness, numbness and headaches.   Hematological: Negative for adenopathy. Does not bruise/bleed easily.   Psychiatric/Behavioral: Negative for confusion, decreased concentration, dysphoric mood, hallucinations, self-injury, sleep disturbance and suicidal ideas. The patient is not nervous/anxious.          Objective:      Physical Exam  Cardiovascular:      Rate and Rhythm: Normal rate and regular rhythm.      Heart sounds: No murmur heard.  Pulmonary:      Effort: Pulmonary effort is normal.      Breath sounds: Normal breath sounds. No rales.   Abdominal:      General: Bowel sounds are normal. There is no distension.      Palpations: Abdomen is soft.      Tenderness: There is no abdominal tenderness.   Musculoskeletal:         General: No tenderness.      Right wrist: Normal.      Left wrist: Normal. No tenderness. Normal range of motion.      Comments: Hand  minimally weak on the right   Neurological:      Mental Status: She is alert and oriented to person, place, and time.         Assessment/plan       Annual physical exam  -     Ambulatory referral/consult to Obstetrics / Gynecology; Future; Expected date: 06/07/2022    Thyroid enlarged with occasional Oropharyngeal dysphagia  -     US Soft Tissue Head Neck Thyroid; Future; Expected date: 05/31/2022    Chronic wrist pain, --right intermittent   Comments:  times two years- would brace at night in case this is weakening from carpal tunnel syndrome  Let us know Linda if you would like to see a hand ortho specialist     Idiopathic hypersomnolence   Continue with Nexplanon daily     Other orders  -     (In Office Administered) Tdap Vaccine

## 2022-07-05 ENCOUNTER — PATIENT MESSAGE (OUTPATIENT)
Dept: INTERNAL MEDICINE | Facility: CLINIC | Age: 22
End: 2022-07-05
Payer: COMMERCIAL

## 2022-08-08 ENCOUNTER — HOSPITAL ENCOUNTER (OUTPATIENT)
Dept: RADIOLOGY | Facility: HOSPITAL | Age: 22
Discharge: HOME | End: 2022-08-08
Attending: INTERNAL MEDICINE
Payer: COMMERCIAL

## 2022-08-08 DIAGNOSIS — E04.9 THYROID ENLARGED: ICD-10-CM

## 2022-08-08 DIAGNOSIS — R13.12 OROPHARYNGEAL DYSPHAGIA: ICD-10-CM

## 2022-08-08 PROCEDURE — 76536 US SOFT TISSUE HEAD NECK THYROID: ICD-10-PCS | Mod: 26,,, | Performed by: RADIOLOGY

## 2022-08-08 PROCEDURE — 76536 US EXAM OF HEAD AND NECK: CPT | Mod: TC

## 2022-08-08 PROCEDURE — 76536 US EXAM OF HEAD AND NECK: CPT | Mod: 26,,, | Performed by: RADIOLOGY

## 2022-08-11 ENCOUNTER — PATIENT MESSAGE (OUTPATIENT)
Dept: INTERNAL MEDICINE | Facility: CLINIC | Age: 22
End: 2022-08-11
Payer: COMMERCIAL

## 2022-08-15 ENCOUNTER — TELEPHONE (OUTPATIENT)
Dept: INTERNAL MEDICINE | Facility: CLINIC | Age: 22
End: 2022-08-15
Payer: COMMERCIAL

## 2022-08-15 NOTE — LETTER
August 15, 2022      [facility]  [address], [address]  [place] [address]  Phone: [phone]  Fax: [phone]       Patient: Linda Bradford   YOB: 2000  Date of Visit: 08/15/2022    To Whom It May Concern:    Jaiden Bradford  was at [facility] on 08/15/2022. The patient has narcolepsy and needs to be on Armodafinil 150 mg daily. Please let me know if you need a verbal order to your pharmacy or have any further questions.     Sincerely,    Ester Michael MD

## 2022-08-16 RX ORDER — ARMODAFINIL 150 MG/1
150 TABLET ORAL DAILY
Qty: 30 TABLET | Refills: 0 | Status: SHIPPED | OUTPATIENT
Start: 2022-08-16 | End: 2022-08-21 | Stop reason: SDUPTHER

## 2022-08-20 ENCOUNTER — PATIENT MESSAGE (OUTPATIENT)
Dept: INTERNAL MEDICINE | Facility: CLINIC | Age: 22
End: 2022-08-20
Payer: COMMERCIAL

## 2022-08-21 RX ORDER — ARMODAFINIL 150 MG/1
150 TABLET ORAL DAILY
Qty: 30 TABLET | Refills: 0 | Status: SHIPPED | OUTPATIENT
Start: 2022-08-21 | End: 2023-01-11 | Stop reason: SDUPTHER

## 2022-08-22 ENCOUNTER — TELEPHONE (OUTPATIENT)
Dept: INTERNAL MEDICINE | Facility: CLINIC | Age: 22
End: 2022-08-22
Payer: COMMERCIAL

## 2022-08-22 DIAGNOSIS — G47.11 HYPERSOMNIA, IDIOPATHIC: Primary | ICD-10-CM

## 2022-08-22 RX ORDER — MODAFINIL 200 MG/1
200 TABLET ORAL DAILY
Qty: 30 TABLET | Refills: 0 | Status: SHIPPED | OUTPATIENT
Start: 2022-08-22 | End: 2022-09-21

## 2022-08-22 NOTE — TELEPHONE ENCOUNTER
I escribed : armodafinil yesterday  It is too early   Pt in [place]  Mom will go  tomorrow  They will like need to pay cash which I am sure mom will do.   Can you call the pharmacy and OK an early refill due to travel and CASH payment if necessary

## 2022-08-22 NOTE — TELEPHONE ENCOUNTER
Can we call the [facility] on  and fil[facility] at [phone] and tell them I changed ricky to provigil from nuvigil because it works better on her     Mom is going to the pharmacy this am before 10 am   Pt is in [place].  [facility]   Both need a PA so mom will pay cost for the medicine     Let me know if there are any issues

## 2022-11-26 ENCOUNTER — PATIENT MESSAGE (OUTPATIENT)
Dept: INTERNAL MEDICINE | Facility: CLINIC | Age: 22
End: 2022-11-26
Payer: COMMERCIAL

## 2022-11-27 RX ORDER — CIPROFLOXACIN 500 MG/1
500 TABLET ORAL EVERY 12 HOURS
Qty: 6 TABLET | Refills: 0 | Status: SHIPPED | OUTPATIENT
Start: 2022-11-27 | End: 2023-01-11

## 2022-11-27 RX ORDER — DIPHENOXYLATE HYDROCHLORIDE AND ATROPINE SULFATE 2.5; .025 MG/1; MG/1
1 TABLET ORAL 2 TIMES DAILY PRN
Qty: 18 TABLET | Refills: 0 | Status: SHIPPED | OUTPATIENT
Start: 2022-11-27 | End: 2022-12-07

## 2023-01-04 ENCOUNTER — OFFICE VISIT (OUTPATIENT)
Dept: OBSTETRICS AND GYNECOLOGY | Facility: CLINIC | Age: 23
End: 2023-01-04
Payer: COMMERCIAL

## 2023-01-04 VITALS
DIASTOLIC BLOOD PRESSURE: 68 MMHG | WEIGHT: 149.94 LBS | SYSTOLIC BLOOD PRESSURE: 112 MMHG | BODY MASS INDEX: 22.72 KG/M2 | HEIGHT: 68 IN

## 2023-01-04 DIAGNOSIS — Z12.4 CERVICAL CANCER SCREENING: ICD-10-CM

## 2023-01-04 DIAGNOSIS — Z01.419 WELL WOMAN EXAM WITH ROUTINE GYNECOLOGICAL EXAM: Primary | ICD-10-CM

## 2023-01-04 PROCEDURE — 3074F SYST BP LT 130 MM HG: CPT | Mod: CPTII,S$GLB,, | Performed by: OBSTETRICS & GYNECOLOGY

## 2023-01-04 PROCEDURE — 1159F PR MEDICATION LIST DOCUMENTED IN MEDICAL RECORD: ICD-10-PCS | Mod: CPTII,S$GLB,, | Performed by: OBSTETRICS & GYNECOLOGY

## 2023-01-04 PROCEDURE — 88175 CYTOPATH C/V AUTO FLUID REDO: CPT | Performed by: OBSTETRICS & GYNECOLOGY

## 2023-01-04 PROCEDURE — 11982 PR REMOVAL DRUG IMPLANT DEVICE: ICD-10-PCS | Mod: S$GLB,,, | Performed by: OBSTETRICS & GYNECOLOGY

## 2023-01-04 PROCEDURE — 99999 PR PBB SHADOW E&M-EST. PATIENT-LVL III: CPT | Mod: PBBFAC,,, | Performed by: OBSTETRICS & GYNECOLOGY

## 2023-01-04 PROCEDURE — 3008F PR BODY MASS INDEX (BMI) DOCUMENTED: ICD-10-PCS | Mod: CPTII,S$GLB,, | Performed by: OBSTETRICS & GYNECOLOGY

## 2023-01-04 PROCEDURE — 1160F PR REVIEW ALL MEDS BY PRESCRIBER/CLIN PHARMACIST DOCUMENTED: ICD-10-PCS | Mod: CPTII,S$GLB,, | Performed by: OBSTETRICS & GYNECOLOGY

## 2023-01-04 PROCEDURE — 3078F DIAST BP <80 MM HG: CPT | Mod: CPTII,S$GLB,, | Performed by: OBSTETRICS & GYNECOLOGY

## 2023-01-04 PROCEDURE — 3078F PR MOST RECENT DIASTOLIC BLOOD PRESSURE < 80 MM HG: ICD-10-PCS | Mod: CPTII,S$GLB,, | Performed by: OBSTETRICS & GYNECOLOGY

## 2023-01-04 PROCEDURE — 99385 PR PREVENTIVE VISIT,NEW,18-39: ICD-10-PCS | Mod: 25,S$GLB,, | Performed by: OBSTETRICS & GYNECOLOGY

## 2023-01-04 PROCEDURE — 99999 PR PBB SHADOW E&M-EST. PATIENT-LVL III: ICD-10-PCS | Mod: PBBFAC,,, | Performed by: OBSTETRICS & GYNECOLOGY

## 2023-01-04 PROCEDURE — 1160F RVW MEDS BY RX/DR IN RCRD: CPT | Mod: CPTII,S$GLB,, | Performed by: OBSTETRICS & GYNECOLOGY

## 2023-01-04 PROCEDURE — 11982 REMOVE DRUG IMPLANT DEVICE: CPT | Mod: S$GLB,,, | Performed by: OBSTETRICS & GYNECOLOGY

## 2023-01-04 PROCEDURE — 3008F BODY MASS INDEX DOCD: CPT | Mod: CPTII,S$GLB,, | Performed by: OBSTETRICS & GYNECOLOGY

## 2023-01-04 PROCEDURE — 3074F PR MOST RECENT SYSTOLIC BLOOD PRESSURE < 130 MM HG: ICD-10-PCS | Mod: CPTII,S$GLB,, | Performed by: OBSTETRICS & GYNECOLOGY

## 2023-01-04 PROCEDURE — 99385 PREV VISIT NEW AGE 18-39: CPT | Mod: 25,S$GLB,, | Performed by: OBSTETRICS & GYNECOLOGY

## 2023-01-04 PROCEDURE — 1159F MED LIST DOCD IN RCRD: CPT | Mod: CPTII,S$GLB,, | Performed by: OBSTETRICS & GYNECOLOGY

## 2023-01-04 NOTE — PROGRESS NOTES
Subjective:       Patient ID: Linda Bradford is a 22 y.o. female.    Chief Complaint:  Well Woman (Nexplanon )      History of Present Illness  HPI    22 y.o. female  here for annual.     She describes her periods as {Desc; regular/irre}, {Normal/Heavy/Light:50592} flow.  {Denies/complains:88909} break through bleeding.   {Denies/complains:07291} vaginal itching or irritation.  {Denies/complains:31950} vaginal discharge.    She {IS / IS NOT:64625} sexually active.  She uses {PLAN CONTRACEPTION:323972} for contraception.    History of abnormal pap: {YES **/NO:25527} Patient has*** had HPV vaccine.   Last Pap: *** - NILM/HPV***.   Last MMG: N/A  Last Colonoscopy: N/A  Last DXA: N/A    Family History   Problem Relation Age of Onset    Singh Parkinson White syndrome Brother     Heart disease Maternal Uncle 63    Cancer Paternal Aunt     Cancer Maternal Grandmother         lymphoma    Stroke Maternal Grandmother     Lymphoma Maternal Grandmother     Stroke Paternal Grandmother     Singh Parkinson White syndrome Cousin     Breast cancer Neg Hx     Colon cancer Neg Hx     Diabetes Neg Hx     Eclampsia Neg Hx     Ovarian cancer Neg Hx      labor Neg Hx            GYN & OB History  Patient's last menstrual period was 2022 (approximate).   Date of Last Pap: No result found    OB History    Para Term  AB Living   0 0 0 0 0 0   SAB IAB Ectopic Multiple Live Births   0 0 0 0 0       Review of Systems  Review of Systems        Objective:    Physical Exam       Assessment:      No diagnosis found.   ***         Plan:      ***

## 2023-01-06 ENCOUNTER — PROCEDURE VISIT (OUTPATIENT)
Dept: OBSTETRICS AND GYNECOLOGY | Facility: CLINIC | Age: 23
End: 2023-01-06
Payer: COMMERCIAL

## 2023-01-06 VITALS
DIASTOLIC BLOOD PRESSURE: 64 MMHG | SYSTOLIC BLOOD PRESSURE: 110 MMHG | BODY MASS INDEX: 22.72 KG/M2 | WEIGHT: 149.94 LBS | HEIGHT: 68 IN

## 2023-01-06 DIAGNOSIS — Z30.46 NEXPLANON REMOVAL: Primary | ICD-10-CM

## 2023-01-06 PROCEDURE — 99499 UNLISTED E&M SERVICE: CPT | Mod: S$GLB,,, | Performed by: OBSTETRICS & GYNECOLOGY

## 2023-01-06 PROCEDURE — 11982 REMOVE DRUG IMPLANT DEVICE: CPT | Mod: S$GLB,,, | Performed by: OBSTETRICS & GYNECOLOGY

## 2023-01-06 PROCEDURE — 99499 NO LOS: ICD-10-PCS | Mod: S$GLB,,, | Performed by: OBSTETRICS & GYNECOLOGY

## 2023-01-06 PROCEDURE — 11982 PR REMOVAL DRUG IMPLANT DEVICE: ICD-10-PCS | Mod: S$GLB,,, | Performed by: OBSTETRICS & GYNECOLOGY

## 2023-01-07 NOTE — PROGRESS NOTES
Subjective:       Patient ID: Linda Bradford is a 22 y.o. female.    Chief Complaint:  Well Woman (Nexplanon )      History of Present Illness  HPI    22 y.o. female  here for annual.     She describes her periods as irregular with Nexplanon in place.  denies break through bleeding.   denies vaginal itching or irritation.  denies vaginal discharge.    She is sexually active.  She uses Nexplanon for contraception. Desires removal.    History of abnormal pap: No Patient has had HPV vaccine.   Last Pap: N/A  Last MMG: N/A  Last Colonoscopy: N/A  Last DXA: N/A    Family History   Problem Relation Age of Onset    Singh Parkinson White syndrome Brother     Heart disease Maternal Uncle 63    Cancer Paternal Aunt     Cancer Maternal Grandmother         lymphoma    Stroke Maternal Grandmother     Lymphoma Maternal Grandmother     Stroke Paternal Grandmother     Singh Parkinson White syndrome Cousin     Breast cancer Neg Hx     Colon cancer Neg Hx     Diabetes Neg Hx     Eclampsia Neg Hx     Ovarian cancer Neg Hx      labor Neg Hx            GYN & OB History  Patient's last menstrual period was 2022 (approximate).   Date of Last Pap: 2023    OB History    Para Term  AB Living   0 0 0 0 0 0   SAB IAB Ectopic Multiple Live Births   0 0 0 0 0       Review of Systems  Review of Systems   Constitutional:  Negative for chills, diaphoresis, fatigue and fever.   Respiratory:  Negative for cough and shortness of breath.    Cardiovascular:  Negative for chest pain and palpitations.   Gastrointestinal:  Negative for abdominal pain, constipation, diarrhea, nausea and vomiting.   Genitourinary:  Negative for dysmenorrhea, dysuria, frequency, menorrhagia, menstrual problem, pelvic pain, vaginal bleeding, vaginal discharge and vaginal pain.   Musculoskeletal:  Negative for back pain and myalgias.   Integumentary:  Negative for rash, acne, breast mass, nipple discharge and breast skin  changes.   Neurological:  Negative for headaches.   Psychiatric/Behavioral:  Negative for depression. The patient is not nervous/anxious.    Breast: Negative for mass, mastodynia, nipple discharge and skin changes        Objective:    Physical Exam:   Constitutional: She is oriented to person, place, and time. She appears well-developed and well-nourished. No distress.    HENT:   Head: Normocephalic and atraumatic.    Eyes: EOM are normal.    Neck: No thyromegaly present.     Pulmonary/Chest: Effort normal. She exhibits no mass and no tenderness. Right breast exhibits no inverted nipple, no mass, no nipple discharge, no skin change, no tenderness and no swelling. Left breast exhibits no inverted nipple, no mass, no nipple discharge, no skin change, no tenderness and no swelling. Breasts are symmetrical.        Abdominal: Soft. She exhibits no distension and no mass. There is no abdominal tenderness. There is no rebound and no guarding. No hernia.     Genitourinary:    Genitourinary Comments: Normal external female genitalia; vagina rugated, normal; cervix normal, no masses; uterus small mobile nontender; no adnexal masses palpated; rectal deferred               Musculoskeletal: Normal range of motion and moves all extremeties.       Neurological: She is alert and oriented to person, place, and time.    Skin: Skin is warm. No rash noted.    Psychiatric: She has a normal mood and affect. Her behavior is normal. Judgment and thought content normal.        Assessment:        1. Well woman exam with routine gynecological exam    2. Cervical cancer screening             Plan:      Linda was seen today for well woman.    Diagnoses and all orders for this visit:    Well woman exam with routine gynecological exam  - Pap guidelines discussed. Pap collected.   - Breast cancer screening not yet indicated.   - Colon cancer screening not yet indicated.   - Bone density screening not yet indicated.  - Contraception - Nexplanon  removal appt scheduled.  - STD screening - declined.      Cervical cancer screening  -     Liquid-Based Pap Smear, Screening        No orders of the defined types were placed in this encounter.      Follow up if symptoms worsen or fail to improve, for nexplanon removal.

## 2023-01-07 NOTE — PROCEDURES
Procedures  Linda Bradford is a 22 y.o. female  presents for Nexplanon removal.    UPT negative.    Nexplanon palpated through the skin. Area wiped with rubbing alcohol.  3 cc of 1% lidocaine without epinephrine was injected in the subcutaneous tissue. The area was prepped with betadine. A stabbing incision was made with an 11 blade scalpel. The Nexplanon was identified and grasped with curved hemostat. It was removed intact. A bandage was placed over the incision site. Pt tolerated the procedure well. Declines contraception Rx.

## 2023-01-10 ENCOUNTER — PATIENT MESSAGE (OUTPATIENT)
Dept: INTERNAL MEDICINE | Facility: CLINIC | Age: 23
End: 2023-01-10
Payer: COMMERCIAL

## 2023-01-10 LAB
FINAL PATHOLOGIC DIAGNOSIS: NORMAL
Lab: NORMAL

## 2023-01-10 NOTE — PROCEDURES
Procedures  Linda Bradford is a 22 y.o. female  presents for Nexplanon removal.    UPT negative.    Nexplanon palpated through the skin.  Area wiped with rubbing alcohol.  3 cc of 1% lidocaine without epinephrine was injected in the subcutaneous tissue.  The area was prepped with betadine.  A stabbing incision was made with an 11 blade scalpel.  The Nexplanon was identified and grasped with curved hemostat.  It was removed intact.  A bandage was placed over the incision site. Pt tolerated the procedure well.

## 2023-01-11 RX ORDER — ARMODAFINIL 150 MG/1
150 TABLET ORAL DAILY
Qty: 30 TABLET | Refills: 0 | Status: SHIPPED | OUTPATIENT
Start: 2023-01-11 | End: 2023-02-06 | Stop reason: SDUPTHER

## 2023-02-06 ENCOUNTER — PATIENT MESSAGE (OUTPATIENT)
Dept: INTERNAL MEDICINE | Facility: CLINIC | Age: 23
End: 2023-02-06
Payer: COMMERCIAL

## 2023-02-06 DIAGNOSIS — G47.11 HYPERSOMNIA, IDIOPATHIC: Primary | ICD-10-CM

## 2023-02-06 DIAGNOSIS — G47.411 PRIMARY NARCOLEPSY WITH CATAPLEXY: ICD-10-CM

## 2023-02-06 RX ORDER — ARMODAFINIL 150 MG/1
150 TABLET ORAL DAILY
Qty: 90 TABLET | Refills: 0 | Status: SHIPPED | OUTPATIENT
Start: 2023-02-06 | End: 2023-05-08 | Stop reason: SDUPTHER

## 2023-02-07 RX ORDER — AZITHROMYCIN 250 MG/1
TABLET, FILM COATED ORAL
Qty: 6 TABLET | Refills: 0 | Status: SHIPPED | OUTPATIENT
Start: 2023-02-07 | End: 2023-02-12

## 2023-02-08 ENCOUNTER — TELEPHONE (OUTPATIENT)
Dept: PHARMACY | Facility: CLINIC | Age: 23
End: 2023-02-08
Payer: COMMERCIAL

## 2023-02-14 ENCOUNTER — PATIENT MESSAGE (OUTPATIENT)
Dept: INTERNAL MEDICINE | Facility: CLINIC | Age: 23
End: 2023-02-14
Payer: COMMERCIAL

## 2023-02-15 RX ORDER — AMOXICILLIN AND CLAVULANATE POTASSIUM 875; 125 MG/1; MG/1
1 TABLET, FILM COATED ORAL EVERY 12 HOURS
Qty: 14 TABLET | Refills: 0 | Status: SHIPPED | OUTPATIENT
Start: 2023-02-15 | End: 2023-02-22

## 2023-03-30 ENCOUNTER — PATIENT MESSAGE (OUTPATIENT)
Dept: INTERNAL MEDICINE | Facility: CLINIC | Age: 23
End: 2023-03-30
Payer: COMMERCIAL

## 2023-03-31 RX ORDER — SULFAMETHOXAZOLE AND TRIMETHOPRIM 800; 160 MG/1; MG/1
1 TABLET ORAL 2 TIMES DAILY
Qty: 10 TABLET | Refills: 0 | Status: SHIPPED | OUTPATIENT
Start: 2023-03-31

## 2023-05-08 DIAGNOSIS — G47.11 HYPERSOMNIA, IDIOPATHIC: ICD-10-CM

## 2023-05-08 DIAGNOSIS — G47.411 PRIMARY NARCOLEPSY WITH CATAPLEXY: ICD-10-CM

## 2023-05-08 RX ORDER — ARMODAFINIL 150 MG/1
150 TABLET ORAL DAILY
Qty: 90 TABLET | Refills: 0 | Status: SHIPPED | OUTPATIENT
Start: 2023-05-08 | End: 2023-05-09 | Stop reason: SDUPTHER

## 2023-05-08 NOTE — TELEPHONE ENCOUNTER
No care due was identified.  [facility] Embedded Care Due Messages. Reference number: [id number].   5/08/2023 9:37:36 AM CDT

## 2023-05-09 ENCOUNTER — PATIENT MESSAGE (OUTPATIENT)
Dept: INTERNAL MEDICINE | Facility: CLINIC | Age: 23
End: 2023-05-09
Payer: COMMERCIAL

## 2023-05-09 DIAGNOSIS — G47.411 PRIMARY NARCOLEPSY WITH CATAPLEXY: ICD-10-CM

## 2023-05-09 DIAGNOSIS — G47.11 HYPERSOMNIA, IDIOPATHIC: ICD-10-CM

## 2023-05-09 RX ORDER — ARMODAFINIL 150 MG/1
150 TABLET ORAL DAILY
Qty: 90 TABLET | Refills: 0 | Status: SHIPPED | OUTPATIENT
Start: 2023-05-09 | End: 2023-05-12 | Stop reason: SDUPTHER

## 2023-05-09 NOTE — TELEPHONE ENCOUNTER
No care due was identified.  [facility] Embedded Care Due Messages. Reference number: [id number].   5/09/2023 4:14:57 PM CDT

## 2023-05-12 DIAGNOSIS — G47.11 HYPERSOMNIA, IDIOPATHIC: ICD-10-CM

## 2023-05-12 DIAGNOSIS — G47.411 PRIMARY NARCOLEPSY WITH CATAPLEXY: ICD-10-CM

## 2023-05-12 RX ORDER — ARMODAFINIL 150 MG/1
150 TABLET ORAL DAILY
Qty: 90 TABLET | Refills: 0 | Status: SHIPPED | OUTPATIENT
Start: 2023-05-12 | End: 2023-08-14 | Stop reason: SDUPTHER

## 2023-05-12 NOTE — TELEPHONE ENCOUNTER
No care due was identified.  [facility] Embedded Care Due Messages. Reference number: [id number].   5/12/2023 11:55:20 AM CDT

## 2023-08-13 DIAGNOSIS — G47.11 HYPERSOMNIA, IDIOPATHIC: ICD-10-CM

## 2023-08-13 DIAGNOSIS — G47.411 PRIMARY NARCOLEPSY WITH CATAPLEXY: ICD-10-CM

## 2023-08-13 NOTE — TELEPHONE ENCOUNTER
No care due was identified.  [facility] Embedded Care Due Messages. Reference number: [id number].   8/13/2023 1:11:55 AM CDT

## 2023-08-14 DIAGNOSIS — G47.11 HYPERSOMNIA, IDIOPATHIC: ICD-10-CM

## 2023-08-14 DIAGNOSIS — G47.411 PRIMARY NARCOLEPSY WITH CATAPLEXY: ICD-10-CM

## 2023-08-14 RX ORDER — ARMODAFINIL 150 MG/1
150 TABLET ORAL
Qty: 90 TABLET | Refills: 0 | Status: SHIPPED | OUTPATIENT
Start: 2023-08-14 | End: 2023-11-13 | Stop reason: SDUPTHER

## 2023-08-14 RX ORDER — ARMODAFINIL 150 MG/1
150 TABLET ORAL DAILY
Qty: 90 TABLET | Refills: 0 | Status: SHIPPED | OUTPATIENT
Start: 2023-08-14 | End: 2023-11-14

## 2023-08-14 NOTE — TELEPHONE ENCOUNTER
No care due was identified.  [facility] Embedded Care Due Messages. Reference number: [id number].   8/14/2023 1:04:21 PM CDT

## 2023-09-27 ENCOUNTER — PATIENT MESSAGE (OUTPATIENT)
Dept: OBSTETRICS AND GYNECOLOGY | Facility: CLINIC | Age: 23
End: 2023-09-27
Payer: COMMERCIAL

## 2023-11-12 DIAGNOSIS — G47.411 PRIMARY NARCOLEPSY WITH CATAPLEXY: ICD-10-CM

## 2023-11-12 DIAGNOSIS — G47.11 HYPERSOMNIA, IDIOPATHIC: ICD-10-CM

## 2023-11-13 DIAGNOSIS — G47.411 PRIMARY NARCOLEPSY WITH CATAPLEXY: ICD-10-CM

## 2023-11-13 DIAGNOSIS — G47.11 HYPERSOMNIA, IDIOPATHIC: ICD-10-CM

## 2023-11-13 NOTE — TELEPHONE ENCOUNTER
Refill Routing Note     Refill Routing Note   Medication(s) are not appropriate for processing by [facility] for the following reason(s):      Medication outside of protocol  Responsible provider unclear    [facility] action(s):  Route Care Due:  None identified     Medication Therapy Plan: no pcp listed on profile      Appointments  past 12m or future 3m with PCP    Date Provider   Last Visit   5/31/2022 Ester Michael MD   Next Visit   Visit date not found Ester Michael MD   ED visits in past 90 days: 0        Note composed:7:27 AM 11/13/2023

## 2023-11-14 RX ORDER — ARMODAFINIL 150 MG/1
150 TABLET ORAL
Qty: 90 TABLET | Refills: 0 | Status: SHIPPED | OUTPATIENT
Start: 2023-11-14

## 2023-11-14 RX ORDER — ARMODAFINIL 150 MG/1
150 TABLET ORAL DAILY
Qty: 90 TABLET | Refills: 0 | Status: SHIPPED | OUTPATIENT
Start: 2023-11-14